# Patient Record
Sex: MALE | Race: WHITE | NOT HISPANIC OR LATINO | Employment: OTHER
[De-identification: names, ages, dates, MRNs, and addresses within clinical notes are randomized per-mention and may not be internally consistent; named-entity substitution may affect disease eponyms.]

---

## 2024-01-24 ENCOUNTER — TRANSCRIBE ORDERS (OUTPATIENT)
Dept: OTHER | Age: 74
End: 2024-01-24

## 2024-01-24 DIAGNOSIS — K60.329 HIGH ANAL FISTULA: Primary | ICD-10-CM

## 2024-02-09 NOTE — TELEPHONE ENCOUNTER
Diagnosis, Referred by & from: High Anal Fistula   Appt date: 3/20/2024   NOTES STATUS DETAILS   OFFICE NOTE from referring provider Care Everywhere [facility]:  1/24/24 - GEN SURG OV with Dr. Roe   OFFICE NOTE from other specialist Care Everywhere [facility]:  12/22/23 - PCC OV with JULIA Miller  2/2/23 - URO OV with Dr. Mcmanus  4/13/17 - GEN SURG OV with Dr. Butcher   DISCHARGE SUMMARY from hospital Care Everywhere [facility]:  2/5/24 - Admission with Jaziel Castillo NP  1/18/24 - Admission with Dr. Mosqueda    [facility]:  1/9/24 - Admission with Dr. Gamble   DISCHARGE REPORT from the ER N/A    OPERATIVE REPORT Care Everywhere [facility]:  1/12/24 - OP Note for Incision and Drainage, Perianal Abscess, Exam Under Anesthesia, Seton Placement, Excision of Perianal Skin Tag with Dr. Roe  8/30/16 - OP Note for Abdominal Wound Debridement with Dr. Duarte  8/15/16 - OP Note for Irrigation, Debridement Abdominal Wound with Dr. Benavides  7/4/16 - OP Note for Open Repair Umbilical Hernia, Mesh with Dr. Butcher   MEDICATION LIST Care Everywhere    LABS     BIOPSIES/PATHOLOGY RELATED TO DIAGNOSIS Care Everywhere [facility]:  1/12/24 - Perianal Skin Tag (Case: [id number])   DIAGNOSTIC PROCEDURES     UPPER ENDOSCOPY (EGD) Care Everywhere [facility]:  4/29/21 - EGD   IMAGING (DISC & REPORT)      CT Received [facility]:  1/11/24 - CT Abd/Pelvis   MRI Received [facility]:  1/27/23 - MRI Prostate   ULTRASOUND  (ENDOANAL/ENDORECTAL) Received [facility]:  10/15/19 - US Abdomen     Records Requested  02/09/24    Facility  [facility]  Fax: [phone]   Outcome * 2/9/24 11:41 AM Faxed urgent request to [facility] for images to be pushed to [facility]. - Shraddha    * 2/21/24 9:53 AM Images received from [facility] and attached to the patient in PACs. - Shraddha

## 2024-03-13 ENCOUNTER — TELEPHONE (OUTPATIENT)
Dept: SURGERY | Facility: CLINIC | Age: 74
End: 2024-03-13
Payer: COMMERCIAL

## 2024-03-13 NOTE — TELEPHONE ENCOUNTER
[facility] Call [facility]    Phone Message    May a detailed message be left on voicemail: yes     Reason for Call: Other: Pt requesting a call back in regards to wanting to know how soon his actual surgery would be scheduled, after his initial consult on 3/20.      Action Taken: Other: clr     Travel Screening: Not Applicable

## 2024-03-20 ENCOUNTER — PRE VISIT (OUTPATIENT)
Dept: SURGERY | Facility: CLINIC | Age: 74
End: 2024-03-20

## 2024-03-20 ENCOUNTER — OFFICE VISIT (OUTPATIENT)
Dept: SURGERY | Facility: CLINIC | Age: 74
End: 2024-03-20
Payer: MEDICARE

## 2024-03-20 VITALS — SYSTOLIC BLOOD PRESSURE: 113 MMHG | HEART RATE: 67 BPM | OXYGEN SATURATION: 97 % | DIASTOLIC BLOOD PRESSURE: 74 MMHG

## 2024-03-20 DIAGNOSIS — K60.30 ANAL FISTULA: Primary | ICD-10-CM

## 2024-03-20 PROCEDURE — 99204 OFFICE O/P NEW MOD 45 MIN: CPT | Mod: 25 | Performed by: NURSE PRACTITIONER

## 2024-03-20 PROCEDURE — 46600 DIAGNOSTIC ANOSCOPY SPX: CPT | Performed by: NURSE PRACTITIONER

## 2024-03-20 RX ORDER — PNV NO.95/FERROUS FUM/FOLIC AC 28MG-0.8MG
500 TABLET ORAL
COMMUNITY
Start: 2024-03-15 | End: 2024-06-13

## 2024-03-20 RX ORDER — TRAZODONE HYDROCHLORIDE 100 MG/1
100 TABLET ORAL
COMMUNITY
Start: 2024-03-08

## 2024-03-20 RX ORDER — FERROUS SULFATE 325(65) MG
325 TABLET ORAL
COMMUNITY
Start: 2023-12-22

## 2024-03-20 RX ORDER — L.RHAMNOSUS/B.ANIMALIS(LACTIS) 3B CELL
1 CAPSULE ORAL EVERY MORNING
COMMUNITY
Start: 2024-03-15 | End: 2024-09-11

## 2024-03-20 RX ORDER — ALLOPURINOL 100 MG/1
100 TABLET ORAL
COMMUNITY
Start: 2023-12-22

## 2024-03-20 RX ORDER — ACETAMINOPHEN 325 MG/1
650 TABLET ORAL EVERY 4 HOURS PRN
COMMUNITY
Start: 2024-02-21

## 2024-03-20 RX ORDER — OMEPRAZOLE 40 MG/1
1 CAPSULE, DELAYED RELEASE ORAL EVERY MORNING
COMMUNITY
Start: 2023-12-22

## 2024-03-20 RX ORDER — ATORVASTATIN CALCIUM 10 MG/1
10 TABLET, FILM COATED ORAL DAILY
COMMUNITY
Start: 2023-12-22

## 2024-03-20 RX ORDER — GABAPENTIN 300 MG/1
CAPSULE ORAL
COMMUNITY
Start: 2023-12-22

## 2024-03-20 RX ORDER — PSYLLIUM HUSK 0.4 G
1000 CAPSULE ORAL
COMMUNITY
Start: 2024-02-20 | End: 2025-02-19

## 2024-03-20 RX ORDER — ACETAMINOPHEN 500 MG
500 TABLET ORAL
COMMUNITY

## 2024-03-20 RX ORDER — BUMETANIDE 0.5 MG/1
0.5 TABLET ORAL
COMMUNITY
Start: 2023-12-22 | End: 2024-12-21

## 2024-03-20 RX ORDER — ASPIRIN 325 MG
325 TABLET ORAL
COMMUNITY

## 2024-03-20 RX ORDER — MELATONIN 10 MG
CAPSULE ORAL
COMMUNITY
Start: 2024-03-05

## 2024-03-20 RX ORDER — HYDRALAZINE HYDROCHLORIDE 50 MG/1
50 TABLET, FILM COATED ORAL
COMMUNITY
Start: 2024-02-19 | End: 2025-02-18

## 2024-03-20 RX ORDER — LISINOPRIL 30 MG/1
30 TABLET ORAL
COMMUNITY
Start: 2023-12-22

## 2024-03-20 RX ORDER — AMOXICILLIN 250 MG
2 CAPSULE ORAL EVERY MORNING
COMMUNITY
Start: 2023-03-15 | End: 2025-02-19

## 2024-03-20 RX ORDER — TAMSULOSIN HYDROCHLORIDE 0.4 MG/1
1 CAPSULE ORAL AT BEDTIME
COMMUNITY
Start: 2024-03-15 | End: 2024-06-13

## 2024-03-20 RX ORDER — VANCOMYCIN HYDROCHLORIDE 125 MG/1
CAPSULE ORAL
COMMUNITY
Start: 2024-02-20

## 2024-03-20 RX ORDER — FOLIC ACID 1 MG/1
1 TABLET ORAL
COMMUNITY
Start: 2023-12-22

## 2024-03-20 RX ORDER — OXYCODONE HYDROCHLORIDE 5 MG/1
TABLET ORAL
Status: ON HOLD | COMMUNITY
Start: 2024-02-21 | End: 2024-05-22

## 2024-03-20 NOTE — PROGRESS NOTES
Colon and Rectal Surgery Consult Clinic Note    Date: 3/20/2024     Referring provider:  Michael Oquendo, DO  [address] [place] [address]     RE: Aniket Ortiz  : 1950  DIONISIO: 3/20/2024    Aniket Ortiz is a very pleasant 74 year old male with past medical conditions including HFpEF, morbid obesity BMi 49.07, CKD stage 3, NERISSA, BMI 46 who presents today for anal fistula.    HPI:  Was admitted with fluid overload after not taking his bumex. He was found to have multiple perirectal wounds requiring I&D in [place] with seton placement for 2 fistula tracts by on 24 by Dr. Castillo.  feculent drainage from an opening near the large verrucal skin tag near the top of the gluteal cleft, there is also feculent drainage from a pinpoint opening midway to the anus, a fistula probe was inserted and these were found to connect the. The skin tag was excised and sent for pathology the opening here was slightly enlarged to allow for further exploration, there was a large amount of feculent/purulent drainage evacuated from this area. We then inserted the bivalve anoscope, there was an obvious fistula opening superior to the sphincters, the external opening for this was just outside the anoderm, this tract was opened using electrocautery given no sphincteric involvement. Careful inspection then revealed a second internal opening, injection of hydrogen peroxide through the other external openings did result in bubbling at this internal opening site. Fistula probe was passed through this area. A vessel loop was then brought to the field and passed through the internal opening to the midpoint external opening, this was secured with silk sutures. An additional seton was placed from the midpoint opening to the furthest opening near the top of the gluteal cleft, this tract is not entirely opened given the complex nation of the fistula disease and the associated abscess at the current time.   Final Diagnosis     A. PERIANAL SKIN TAG, EXCISION  --BENIGN FIBROEPITHELIAL POLYP (SKIN TAG) WITH ASSOCIATED NONSPECIFIC ACUTE AND CHRONIC INFLAMMATION, ACANTHOSIS AND REACTIVE CHANGE  --NO EVIDENCE OF DYSPLASIA OR MALIGNANCY     CT AP 24:  No rim enhancement but air and fluid suggesting abscess.  Axial 7 x 8.3 cm.   Craniocaudal 9 cm.   Moderate rectal and distal sigmoid fecal loading     Physical Examination: Exam was chaperoned by Franky Woodson, EMT-P   /74 (BP Location: Right arm, Patient Position: Sitting, Cuff Size: Adult Large)   Pulse 67   SpO2 97%             Interval History:  Aniket presents today with his wife and son. He has some continued drainage but this is minor. Discomfort with sitting for a long time but otherwise no continuous pain. No difficulty with bowel movements. Stools are formed and soft. History of c.diff. Has never had a colonoscopy. No prior anorectal disease.  Has an indwelling mejias that he wants removed but was reportedly told that it has to stay in until he has surgery on his bottom.    General: alert, oriented, sitting in wheelchair and able to transfer with assist of two  HEENT: mucous membranes moist  : Indwelling mejias catheter present with clear yellow urine    Perianal external examination:  Thickened, firm tissue at the top of the  cleft with seton in place. This extends toward the anal opening where there is a second seton into the anal canal.     Digital rectal examination: Was performed.   Sphincter tone: Good.  Palpable lesions: Yes - Location: seton and ulceration present in the posterior anal canal.  Prostate: Not assessed.  Other: None.    Anoscopy: Was performed.   Hemorrhoids: Yes. 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 Would like to get a 3T pelvic MRI to further define the tract with likely EUA with possible fistulotomy. Has some reactive tissue at the most distal seton site with  biopsies that were normal. Has never had a colonoscopy and is agreeable to getting this closer to home. If not, will need a flexible sigmoidoscopy at the time of EUA. Daniela does not need to stay in place from our standpoint and I advised having him follow up with his PCP to ensure there is not another reason that it is still in place. Patient's questions were answered to his stated satisfaction and he is in agreement with this plan.     Medical history:  No past medical history on file.    Surgical history:  No past surgical history on file.    Problem list:  There are no problems to display for this patient.      Medications:  Current Outpatient Medications   Medication Sig Dispense Refill    acetaminophen (TYLENOL) 325 MG tablet Take 650 mg by mouth every 4 hours as needed      acetaminophen (TYLENOL) 500 MG tablet Take 500 mg by mouth      allopurinol (ZYLOPRIM) 100 MG tablet Take 100 mg by mouth      aspirin (ASA) 325 MG tablet Take 325 mg by mouth      atorvastatin (LIPITOR) 10 MG tablet Take 10 mg by mouth daily      bumetanide (BUMEX) 0.5 MG tablet Take 0.5 mg by mouth      ferrous sulfate (FEROSUL) 325 (65 Fe) MG tablet Take 325 mg by mouth      folic acid (FOLVITE) 1 MG tablet Take 1 mg by mouth      gabapentin (NEURONTIN) 300 MG capsule 2 capsules each morning, 2 capsules at noon, 3 capsules at bedtime.      hydrALAZINE (APRESOLINE) 50 MG tablet Take 50 mg by mouth      lisinopril (ZESTRIL) 30 MG tablet Take 30 mg by mouth      Magnesium Oxide 250 MG tablet Take 500 mg by mouth      Melatonin 10 MG CAPS TAKE 1 CAP BY MOUTH IN THE EVENING      omeprazole (PRILOSEC) 40 MG DR capsule Take 1 capsule by mouth every morning      oxyCODONE (ROXICODONE) 5 MG tablet TAKE 1 TABLET BY MOUTH EVERY 8 HOURS AS NEEDED FOR MODERATE TO SEVERE PAIN. MAX DAILY AMOUTH 15MG      Probiotic Product ([place]) CAPS Take 1 capsule by mouth every morning      senna-docusate (SENOKOT-S/PERICOLACE) 8.6-50 MG tablet Take  2 tablets by mouth every morning      tamsulosin (FLOMAX) 0.4 MG capsule Take 1 capsule by mouth at bedtime      traZODone (DESYREL) 100 MG tablet Take 100 mg by mouth      vancomycin (VANCOCIN) 125 MG capsule EKIT BILLING      VITAMIN D-1000 MAX ST 25 MCG (1000 UT) tablet Take 1,000 Units by mouth         Allergies:  Allergies   Allergen Reactions    Rofecoxib Diarrhea and Rash    Sulfamethoxazole-Trimethoprim Nausea and Vomiting       Family history:  No family history on file.    Social history:  Social History     Tobacco Use    Smoking status: Never    Smokeless tobacco: Never   Substance Use Topics    Alcohol use: Not on file    Marital status: .    Nursing Notes:   Soheila Mercado  3/20/2024 12:17 PM  Signed  Chief Complaint   Patient presents with    Consult     Anal Fistula with seton placement       Vitals:    03/20/24 1212   BP: 113/74   BP Location: Right arm   Patient Position: Sitting   Cuff Size: Adult Large   Pulse: 67   SpO2: 97%       There is no height or weight on file to calculate BMI.    Soheila Mercado CMA       45 minutes spent on the date of encounter performing chart review, history and exam, documentation and further activities as noted above with an additional 2 minutes for anoscopy.     SLICK Choi, NP-C  Colon and Rectal Surgery   [facility]    This note was created using speech recognition software and may contain unintended word substitutions.

## 2024-03-20 NOTE — LETTER
3/20/2024       RE: Aniket Ortiz  [address]  [facility] [address]       Dear Colleague,    Thank you for referring your patient, Aniket Ortiz, to the [facility] [place] [facility]. Please see a copy of my visit note below.    Colon and Rectal Surgery Consult Clinic Note    Date: 3/20/2024     Referring provider:  Michael Oquendo, DO  [address] [place] [place] [address]     RE: Aniket Ortiz  : 1950  DIONISIO: 3/20/2024    Aniket Ortiz is a very pleasant 74 year old male with past medical conditions including HFpEF, morbid obesity BMi 49.07, CKD stage 3, NERISSA, BMI 46 who presents today for anal fistula.    HPI:  Was admitted with fluid overload after not taking his bumex. He was found to have multiple perirectal wounds requiring I&D in [place] with seton placement for 2 fistula tracts by on 24 by Dr. Castillo.  feculent drainage from an opening near the large verrucal skin tag near the top of the gluteal cleft, there is also feculent drainage from a pinpoint opening midway to the anus, a fistula probe was inserted and these were found to connect the. The skin tag was excised and sent for pathology the opening here was slightly enlarged to allow for further exploration, there was a large amount of feculent/purulent drainage evacuated from this area. We then inserted the bivalve anoscope, there was an obvious fistula opening superior to the sphincters, the external opening for this was just outside the anoderm, this tract was opened using electrocautery given no sphincteric involvement. Careful inspection then revealed a second internal opening, injection of hydrogen peroxide through the other external openings did result in bubbling at this internal opening site. Fistula probe was passed through this area. A vessel loop was then brought to the field and passed through the internal opening to  the midpoint external opening, this was secured with silk sutures. An additional seton was placed from the midpoint opening to the furthest opening near the top of the gluteal cleft, this tract is not entirely opened given the complex nation of the fistula disease and the associated abscess at the current time.   Final Diagnosis    A. PERIANAL SKIN TAG, EXCISION  --BENIGN FIBROEPITHELIAL POLYP (SKIN TAG) WITH ASSOCIATED NONSPECIFIC ACUTE AND CHRONIC INFLAMMATION, ACANTHOSIS AND REACTIVE CHANGE  --NO EVIDENCE OF DYSPLASIA OR MALIGNANCY     CT AP 24:  No rim enhancement but air and fluid suggesting abscess.  Axial 7 x 8.3 cm.   Craniocaudal 9 cm.   Moderate rectal and distal sigmoid fecal loading     Physical Examination: Exam was chaperoned by Franky Woodson, EMT-P   /74 (BP Location: Right arm, Patient Position: Sitting, Cuff Size: Adult Large)   Pulse 67   SpO2 97%             Interval History:  Aniket presents today with his wife and son. He has some continued drainage but this is minor. Discomfort with sitting for a long time but otherwise no continuous pain. No difficulty with bowel movements. Stools are formed and soft. History of c.diff. Has never had a colonoscopy. No prior anorectal disease.  Has an indwelling mejias that he wants removed but was reportedly told that it has to stay in until he has surgery on his bottom.    General: alert, oriented, sitting in wheelchair and able to transfer with assist of two  HEENT: mucous membranes moist  : Indwelling mejias catheter present with clear yellow urine    Perianal external examination:  Thickened, firm tissue at the top of the herbert cleft with seton in place. This extends toward the anal opening where there is a second seton into the anal canal.     Digital rectal examination: Was performed.   Sphincter tone: Good.  Palpable lesions: Yes - Location: seton and ulceration present in the posterior anal canal.  Prostate: Not assessed.  Other:  None.    Anoscopy: Was performed.   Hemorrhoids: Yes. 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 Would like to get a 3T pelvic MRI to further define the tract with likely EUA with possible fistulotomy. Has some reactive tissue at the most distal seton site with biopsies that were normal. Has never had a colonoscopy and is agreeable to getting this closer to home. If not, will need a flexible sigmoidoscopy at the time of EUA. Suarez does not need to stay in place from our standpoint and I advised having him follow up with his PCP to ensure there is not another reason that it is still in place. Patient's questions were answered to his stated satisfaction and he is in agreement with this plan.     Medical history:  No past medical history on file.    Surgical history:  No past surgical history on file.    Problem list:  There are no problems to display for this patient.      Medications:  Current Outpatient Medications   Medication Sig Dispense Refill    acetaminophen (TYLENOL) 325 MG tablet Take 650 mg by mouth every 4 hours as needed      acetaminophen (TYLENOL) 500 MG tablet Take 500 mg by mouth      allopurinol (ZYLOPRIM) 100 MG tablet Take 100 mg by mouth      aspirin (ASA) 325 MG tablet Take 325 mg by mouth      atorvastatin (LIPITOR) 10 MG tablet Take 10 mg by mouth daily      bumetanide (BUMEX) 0.5 MG tablet Take 0.5 mg by mouth      ferrous sulfate (FEROSUL) 325 (65 Fe) MG tablet Take 325 mg by mouth      folic acid (FOLVITE) 1 MG tablet Take 1 mg by mouth      gabapentin (NEURONTIN) 300 MG capsule 2 capsules each morning, 2 capsules at noon, 3 capsules at bedtime.      hydrALAZINE (APRESOLINE) 50 MG tablet Take 50 mg by mouth      lisinopril (ZESTRIL) 30 MG tablet Take 30 mg by mouth      Magnesium Oxide 250 MG tablet Take 500 mg by mouth      Melatonin 10 MG CAPS TAKE 1 CAP BY MOUTH IN THE EVENING      omeprazole (PRILOSEC) 40 MG  DR capsule Take 1 capsule by mouth every morning      oxyCODONE (ROXICODONE) 5 MG tablet TAKE 1 TABLET BY MOUTH EVERY 8 HOURS AS NEEDED FOR MODERATE TO SEVERE PAIN. MAX DAILY AMOUTH 15MG      Probiotic Product ([place]eg[place]) CAPS Take 1 capsule by mouth every morning      senna-docusate (SENOKOT-S/PERICOLACE) 8.6-50 MG tablet Take 2 tablets by mouth every morning      tamsulosin (FLOMAX) 0.4 MG capsule Take 1 capsule by mouth at bedtime      traZODone (DESYREL) 100 MG tablet Take 100 mg by mouth      vancomycin (VANCOCIN) 125 MG capsule EKIT BILLING      VITAMIN D-1000 MAX ST 25 MCG (1000 UT) tablet Take 1,000 Units by mouth         Allergies:  Allergies   Allergen Reactions    Rofecoxib Diarrhea and Rash    Sulfamethoxazole-Trimethoprim Nausea and Vomiting       Family history:  No family history on file.    Social history:  Social History     Tobacco Use    Smoking status: Never    Smokeless tobacco: Never   Substance Use Topics    Alcohol use: Not on file    Marital status: .    Nursing Notes:   Soheila Mercado  3/20/2024 12:17 PM  Signed  Chief Complaint   Patient presents with    Consult     Anal Fistula with seton placement       Vitals:    03/20/24 1212   BP: 113/74   BP Location: Right arm   Patient Position: Sitting   Cuff Size: Adult Large   Pulse: 67   SpO2: 97%       There is no height or weight on file to calculate BMI.    Soheila Mercado CMA       45 minutes spent on the date of encounter performing chart review, history and exam, documentation and further activities as noted above with an additional 2 minutes for anoscopy.       This note was created using speech recognition software and may contain unintended word substitutions.          Again, thank you for allowing me to participate in the care of your patient.      Sincerely,    SLICK Lake CNP

## 2024-03-20 NOTE — NURSING NOTE
Chief Complaint   Patient presents with    Consult     Anal Fistula with seton placement       Vitals:    03/20/24 1212   BP: 113/74   BP Location: Right arm   Patient Position: Sitting   Cuff Size: Adult Large   Pulse: 67   SpO2: 97%       There is no height or weight on file to calculate BMI.    Soheila Mercado, CMA

## 2024-03-27 ENCOUNTER — ANCILLARY PROCEDURE (OUTPATIENT)
Dept: MRI IMAGING | Facility: CLINIC | Age: 74
End: 2024-03-27
Attending: NURSE PRACTITIONER
Payer: MEDICARE

## 2024-03-27 PROCEDURE — 72197 MRI PELVIS W/O & W/DYE: CPT | Mod: MG | Performed by: RADIOLOGY

## 2024-03-27 PROCEDURE — G1010 CDSM STANSON: HCPCS | Performed by: RADIOLOGY

## 2024-03-27 PROCEDURE — A9585 GADOBUTROL INJECTION: HCPCS | Performed by: RADIOLOGY

## 2024-03-27 RX ORDER — GADOBUTROL 604.72 MG/ML
15 INJECTION INTRAVENOUS ONCE
Status: COMPLETED | OUTPATIENT
Start: 2024-03-27 | End: 2024-03-27

## 2024-03-27 RX ADMIN — GADOBUTROL 15 ML: 604.72 INJECTION INTRAVENOUS at 09:55

## 2024-03-27 NOTE — DISCHARGE INSTRUCTIONS
MRI Contrast Discharge Instructions    The IV contrast you received today will pass out of your body in your  urine. This will happen in the next 24 hours. You will not feel this process.  Your urine will not change color.    Drink at least 4 extra glasses of water or juice today (unless your doctor  has restricted your fluids). This reduces the stress on your kidneys.  You may take your regular medicines.    If you are on dialysis: It is best to have dialysis today.    If you have a reaction: Most reactions happen right away. If you have  any new symptoms after leaving the hospital (such as hives or swelling),  call your hospital at the correct number below. Or call your family doctor.  If you have breathing distress or wheezing, call 911.    Special instructions: ***    I have read and understand the above information.    Signature:______________________________________ Date:___________    Staff:__________________________________________ Date:___________     Time:__________    [facility] Departments:    ___[place]: [phone]  ___[facility]: [phone]  ___[place]: [phone] ___[facility]: [phone]  ___[facility]: [phone]  ___[facility]: [phone]  ___[phone]  ___[facility]: [phone]  ___[phone]

## 2024-04-10 DIAGNOSIS — K60.30 ANAL FISTULA: Primary | ICD-10-CM

## 2024-04-24 ENCOUNTER — HOSPITAL ENCOUNTER (OUTPATIENT)
Facility: CLINIC | Age: 74
End: 2024-04-24
Attending: SURGERY | Admitting: SURGERY
Payer: MEDICARE

## 2024-04-24 ENCOUNTER — TELEPHONE (OUTPATIENT)
Dept: SURGERY | Facility: CLINIC | Age: 74
End: 2024-04-24
Payer: COMMERCIAL

## 2024-04-24 PROBLEM — K60.30 ANAL FISTULA: Status: ACTIVE | Noted: 2024-04-10

## 2024-04-24 NOTE — TELEPHONE ENCOUNTER
Patient is scheduled for outpatient surgery with Dr. Jose Padilla     Spoke with: Aniket    Date of Surgery: Fri 5/31/2024    Location: [facility] OR    Informed patient they will need an adult  YES    Pre op with Provider Dr. Jose Padilla     H&P: to be scheduled by patient with [facility] in [facility]    2-3 wk Post-op appt scheduled with Solange Avilez PA-C on 6/17/2024 at 9:00 AM    Surgery packet: will Mail     Additional comments:   - Spoke with patient and YENY Amador, from [facility] (comes to patient's home twice per week for wound care, drain monitoring); she will be back at patient's home on Friday 4/26/2024    - forwarded medical concern to Dr. Padilla's RNCC YENY Hamilton, to follow up with patient on his mobile phone number    Darcie Pandya  Miracle-op Coordinator  [place]  Direct Phone: [phone]

## 2024-05-06 RX ORDER — ACETAMINOPHEN 325 MG/1
975 TABLET ORAL ONCE
Status: CANCELLED | OUTPATIENT
Start: 2024-05-06 | End: 2024-05-06

## 2024-05-06 RX ORDER — ONDANSETRON 2 MG/ML
4 INJECTION INTRAMUSCULAR; INTRAVENOUS ONCE
Status: CANCELLED | OUTPATIENT
Start: 2024-05-06 | End: 2024-05-06

## 2024-05-06 NOTE — TELEPHONE ENCOUNTER
Patient is rescheduled for outpatient surgery with Dr. Jose Padilla      Spoke with: Aniket     Date of Surgery: Fri 5/23/2024     Location[facility] OR     Informed patient they will need an adult  YES     Pre op with Provider Dr. Jose Padilla      H&P: to be scheduled by patient with [facility] in [facility]  *Patient says that this is scheduled on 5/15/2024     2-3 wk Post-op appt scheduled with Solange Avilez PA-C on 6/17/2024 at 9:00 AM     Surgery packet: will Mail      Additional comments:   - patient's surgery was rescheduled from 5/31/2024 to 5/23/2024  - patient says that he will have a ride on new DOS 5/23/2024    Darcie Pandya  Miracle-op Coordinator  [place]  Direct Phone: [phone]

## 2024-05-10 VITALS — HEIGHT: 71 IN | BODY MASS INDEX: 44.1 KG/M2 | WEIGHT: 315 LBS

## 2024-05-10 NOTE — TELEPHONE ENCOUNTER
Received call from [facility] OR Control, patient's BMI is +49, which is too high for [facility] OR, so surgery must be moved to a Hospital OR.    Patient is rescheduled for surgery with Dr. Jose Padilla     Spoke with: Aniket Bhagat Date of Surgery: Weds 5/22/2024    Location: [facility] OR    Informed patient they will need an adult  YES     Pre op with Provider Dr. Jose Padilla      H&P: to be scheduled by patient with [facility] in [facility]  *Patient says that this is scheduled on 5/15/2024    Colonoscopy scheduled at [facility] on 5/20/2024     2-3 wk Post-op appt scheduled with Solange Avilez PA-C on 6/17/2024 at 9:00 AM     Surgery packet: will Mail      Additional comments:   - patient's surgery was rescheduled from 5/23/2024 at [facility] OR to 5/22/2024 at [place] OR d/t BMI too high for [facility] OR  - [facility] that [facility]  - Per YENY Hamilton, the results for patient's Colonoscopy scheduled at [facility] on 5/20/2024 will be received in time for surgery at [facility] on 5/22/2024    Darcie Pandya  Miracle-op Coordinator  [place]  Direct Phone: [phone]

## 2024-05-20 ENCOUNTER — TELEPHONE (OUTPATIENT)
Dept: SURGERY | Facility: CLINIC | Age: 74
End: 2024-05-20
Payer: COMMERCIAL

## 2024-05-20 NOTE — TELEPHONE ENCOUNTER
Spoke with Mary Jo from [facility]. She states that patient completed his Pre-op H&P and an EKG at [facility]. These are for patient's upcoming surgery with Dr. Padilla on 5/22/2024.    Mary Jo at [facility] to fax patient's Pre-op H&P and EKG to [facility] Pre-[facility]missions Fax # [phone].     This info may also be available in Care Everywhere.    Darcie Pandya  Miracle-op Coordinator  [facility]  Direct Phone: [phone]

## 2024-05-21 RX ORDER — ASPIRIN 81 MG/1
81 TABLET ORAL DAILY
COMMUNITY

## 2024-05-22 ENCOUNTER — HOSPITAL ENCOUNTER (OUTPATIENT)
Facility: CLINIC | Age: 74
Discharge: HOME | End: 2024-05-22
Attending: SURGERY | Admitting: SURGERY
Payer: MEDICARE

## 2024-05-22 ENCOUNTER — ANESTHESIA EVENT (OUTPATIENT)
Dept: SURGERY | Facility: CLINIC | Age: 74
End: 2024-05-22
Payer: MEDICARE

## 2024-05-22 ENCOUNTER — ANESTHESIA (OUTPATIENT)
Dept: SURGERY | Facility: CLINIC | Age: 74
End: 2024-05-22
Payer: MEDICARE

## 2024-05-22 VITALS
DIASTOLIC BLOOD PRESSURE: 86 MMHG | RESPIRATION RATE: 20 BRPM | HEART RATE: 107 BPM | HEIGHT: 71 IN | TEMPERATURE: 97.9 F | BODY MASS INDEX: 44.1 KG/M2 | OXYGEN SATURATION: 97 % | SYSTOLIC BLOOD PRESSURE: 151 MMHG | WEIGHT: 315 LBS

## 2024-05-22 DIAGNOSIS — K60.30 ANAL FISTULA: Primary | ICD-10-CM

## 2024-05-22 PROCEDURE — 46280 REMOVE ANAL FIST COMPLEX: CPT | Mod: GC | Performed by: SURGERY

## 2024-05-22 PROCEDURE — 87176 TISSUE HOMOGENIZATION CULTR: CPT | Performed by: SURGERY

## 2024-05-22 PROCEDURE — 87206 SMEAR FLUORESCENT/ACID STAI: CPT | Performed by: SURGERY

## 2024-05-22 PROCEDURE — 250N000011 HC RX IP 250 OP 636: Performed by: NURSE ANESTHETIST, CERTIFIED REGISTERED

## 2024-05-22 PROCEDURE — 250N000011 HC RX IP 250 OP 636: Performed by: SURGERY

## 2024-05-22 PROCEDURE — 250N000009 HC RX 250: Performed by: SURGERY

## 2024-05-22 PROCEDURE — 46280 REMOVE ANAL FIST COMPLEX: CPT | Performed by: ANESTHESIOLOGY

## 2024-05-22 PROCEDURE — 999N000141 HC STATISTIC PRE-PROCEDURE NURSING ASSESSMENT: Performed by: SURGERY

## 2024-05-22 PROCEDURE — 46280 REMOVE ANAL FIST COMPLEX: CPT | Performed by: NURSE ANESTHETIST, CERTIFIED REGISTERED

## 2024-05-22 PROCEDURE — 710N000012 HC RECOVERY PHASE 2, PER MINUTE: Performed by: SURGERY

## 2024-05-22 PROCEDURE — 370N000017 HC ANESTHESIA TECHNICAL FEE, PER MIN: Performed by: SURGERY

## 2024-05-22 PROCEDURE — 250N000013 HC RX MED GY IP 250 OP 250 PS 637: Performed by: SURGERY

## 2024-05-22 PROCEDURE — 87081 CULTURE SCREEN ONLY: CPT | Performed by: SURGERY

## 2024-05-22 PROCEDURE — 99100 ANES PT EXTEME AGE<1 YR&>70: CPT | Performed by: NURSE ANESTHETIST, CERTIFIED REGISTERED

## 2024-05-22 PROCEDURE — 250N000009 HC RX 250: Performed by: ANESTHESIOLOGY

## 2024-05-22 PROCEDURE — 99100 ANES PT EXTEME AGE<1 YR&>70: CPT | Performed by: ANESTHESIOLOGY

## 2024-05-22 PROCEDURE — 272N000001 HC OR GENERAL SUPPLY STERILE: Performed by: SURGERY

## 2024-05-22 PROCEDURE — 360N000075 HC SURGERY LEVEL 2, PER MIN: Performed by: SURGERY

## 2024-05-22 PROCEDURE — 258N000003 HC RX IP 258 OP 636: Performed by: NURSE ANESTHETIST, CERTIFIED REGISTERED

## 2024-05-22 RX ORDER — OXYCODONE HYDROCHLORIDE 5 MG/1
5 TABLET ORAL
Status: DISCONTINUED | OUTPATIENT
Start: 2024-05-22 | End: 2024-05-22

## 2024-05-22 RX ORDER — ONDANSETRON 2 MG/ML
4 INJECTION INTRAMUSCULAR; INTRAVENOUS ONCE
Status: DISCONTINUED | OUTPATIENT
Start: 2024-05-22 | End: 2024-05-22 | Stop reason: HOSPADM

## 2024-05-22 RX ORDER — ACETAMINOPHEN 325 MG/1
975 TABLET ORAL ONCE
Status: COMPLETED | OUTPATIENT
Start: 2024-05-22 | End: 2024-05-22

## 2024-05-22 RX ORDER — OXYCODONE HYDROCHLORIDE 5 MG/1
5-10 TABLET ORAL EVERY 4 HOURS PRN
Qty: 15 TABLET | Refills: 0 | Status: SHIPPED | OUTPATIENT
Start: 2024-05-22

## 2024-05-22 RX ORDER — GLYCOPYRROLATE 0.2 MG/ML
INJECTION, SOLUTION INTRAMUSCULAR; INTRAVENOUS PRN
Status: DISCONTINUED | OUTPATIENT
Start: 2024-05-22 | End: 2024-05-22

## 2024-05-22 RX ORDER — NALOXONE HYDROCHLORIDE 0.4 MG/ML
0.1 INJECTION, SOLUTION INTRAMUSCULAR; INTRAVENOUS; SUBCUTANEOUS
Status: DISCONTINUED | OUTPATIENT
Start: 2024-05-22 | End: 2024-05-22

## 2024-05-22 RX ORDER — ONDANSETRON 4 MG/1
4 TABLET, ORALLY DISINTEGRATING ORAL EVERY 30 MIN PRN
Status: DISCONTINUED | OUTPATIENT
Start: 2024-05-22 | End: 2024-05-22

## 2024-05-22 RX ORDER — PROPOFOL 10 MG/ML
INJECTION, EMULSION INTRAVENOUS CONTINUOUS PRN
Status: DISCONTINUED | OUTPATIENT
Start: 2024-05-22 | End: 2024-05-22

## 2024-05-22 RX ORDER — LIDOCAINE 40 MG/G
CREAM TOPICAL
Status: DISCONTINUED | OUTPATIENT
Start: 2024-05-22 | End: 2024-05-22 | Stop reason: HOSPADM

## 2024-05-22 RX ORDER — HYDROMORPHONE HYDROCHLORIDE 1 MG/ML
0.2 INJECTION, SOLUTION INTRAMUSCULAR; INTRAVENOUS; SUBCUTANEOUS EVERY 5 MIN PRN
Status: DISCONTINUED | OUTPATIENT
Start: 2024-05-22 | End: 2024-05-22

## 2024-05-22 RX ORDER — FENTANYL CITRATE 50 UG/ML
25 INJECTION, SOLUTION INTRAMUSCULAR; INTRAVENOUS EVERY 5 MIN PRN
Status: DISCONTINUED | OUTPATIENT
Start: 2024-05-22 | End: 2024-05-22

## 2024-05-22 RX ORDER — ONDANSETRON 4 MG/1
4 TABLET, ORALLY DISINTEGRATING ORAL
Status: DISCONTINUED | OUTPATIENT
Start: 2024-05-22 | End: 2024-05-22 | Stop reason: HOSPADM

## 2024-05-22 RX ORDER — OXYCODONE HYDROCHLORIDE 10 MG/1
10 TABLET ORAL
Status: DISCONTINUED | OUTPATIENT
Start: 2024-05-22 | End: 2024-05-22

## 2024-05-22 RX ORDER — DEXAMETHASONE SODIUM PHOSPHATE 4 MG/ML
4 INJECTION, SOLUTION INTRA-ARTICULAR; INTRALESIONAL; INTRAMUSCULAR; INTRAVENOUS; SOFT TISSUE
Status: DISCONTINUED | OUTPATIENT
Start: 2024-05-22 | End: 2024-05-22

## 2024-05-22 RX ORDER — HYDROMORPHONE HYDROCHLORIDE 1 MG/ML
0.4 INJECTION, SOLUTION INTRAMUSCULAR; INTRAVENOUS; SUBCUTANEOUS EVERY 5 MIN PRN
Status: DISCONTINUED | OUTPATIENT
Start: 2024-05-22 | End: 2024-05-22

## 2024-05-22 RX ORDER — FENTANYL CITRATE 50 UG/ML
INJECTION, SOLUTION INTRAMUSCULAR; INTRAVENOUS PRN
Status: DISCONTINUED | OUTPATIENT
Start: 2024-05-22 | End: 2024-05-22

## 2024-05-22 RX ORDER — ONDANSETRON 2 MG/ML
INJECTION INTRAMUSCULAR; INTRAVENOUS PRN
Status: DISCONTINUED | OUTPATIENT
Start: 2024-05-22 | End: 2024-05-22

## 2024-05-22 RX ORDER — ONDANSETRON 2 MG/ML
4 INJECTION INTRAMUSCULAR; INTRAVENOUS EVERY 30 MIN PRN
Status: DISCONTINUED | OUTPATIENT
Start: 2024-05-22 | End: 2024-05-22

## 2024-05-22 RX ORDER — FENTANYL CITRATE 50 UG/ML
50 INJECTION, SOLUTION INTRAMUSCULAR; INTRAVENOUS EVERY 5 MIN PRN
Status: DISCONTINUED | OUTPATIENT
Start: 2024-05-22 | End: 2024-05-22

## 2024-05-22 RX ORDER — SODIUM CHLORIDE, SODIUM LACTATE, POTASSIUM CHLORIDE, CALCIUM CHLORIDE 600; 310; 30; 20 MG/100ML; MG/100ML; MG/100ML; MG/100ML
INJECTION, SOLUTION INTRAVENOUS CONTINUOUS PRN
Status: DISCONTINUED | OUTPATIENT
Start: 2024-05-22 | End: 2024-05-22

## 2024-05-22 RX ORDER — NYSTATIN 100000 [USP'U]/G
POWDER TOPICAL
COMMUNITY
Start: 2024-03-19 | End: 2025-03-19

## 2024-05-22 RX ORDER — SODIUM CHLORIDE, SODIUM LACTATE, POTASSIUM CHLORIDE, CALCIUM CHLORIDE 600; 310; 30; 20 MG/100ML; MG/100ML; MG/100ML; MG/100ML
INJECTION, SOLUTION INTRAVENOUS CONTINUOUS
Status: DISCONTINUED | OUTPATIENT
Start: 2024-05-22 | End: 2024-05-22

## 2024-05-22 RX ADMIN — SODIUM CHLORIDE, POTASSIUM CHLORIDE, SODIUM LACTATE AND CALCIUM CHLORIDE: 600; 310; 30; 20 INJECTION, SOLUTION INTRAVENOUS at 11:15

## 2024-05-22 RX ADMIN — MIDAZOLAM 1 MG: 1 INJECTION INTRAMUSCULAR; INTRAVENOUS at 11:57

## 2024-05-22 RX ADMIN — FENTANYL CITRATE 50 MCG: 50 INJECTION INTRAMUSCULAR; INTRAVENOUS at 11:23

## 2024-05-22 RX ADMIN — GLYCOPYRROLATE 0.2 MG: 0.2 INJECTION, SOLUTION INTRAMUSCULAR; INTRAVENOUS at 11:15

## 2024-05-22 RX ADMIN — ONDANSETRON 4 MG: 2 INJECTION INTRAMUSCULAR; INTRAVENOUS at 12:20

## 2024-05-22 RX ADMIN — LIDOCAINE HYDROCHLORIDE 0.2 ML: 10 INJECTION, SOLUTION EPIDURAL; INFILTRATION; INTRACAUDAL; PERINEURAL at 10:11

## 2024-05-22 RX ADMIN — PROPOFOL 50 MCG/KG/MIN: 10 INJECTION, EMULSION INTRAVENOUS at 11:20

## 2024-05-22 RX ADMIN — FENTANYL CITRATE 50 MCG: 50 INJECTION INTRAMUSCULAR; INTRAVENOUS at 11:45

## 2024-05-22 RX ADMIN — MIDAZOLAM 1 MG: 1 INJECTION INTRAMUSCULAR; INTRAVENOUS at 11:23

## 2024-05-22 RX ADMIN — ACETAMINOPHEN 975 MG: 325 TABLET, FILM COATED ORAL at 10:24

## 2024-05-22 ASSESSMENT — ACTIVITIES OF DAILY LIVING (ADL)
ADLS_ACUITY_SCORE: 33
ADLS_ACUITY_SCORE: 35

## 2024-05-22 ASSESSMENT — LIFESTYLE VARIABLES: TOBACCO_USE: 1

## 2024-05-22 NOTE — DISCHARGE INSTRUCTIONS
Contacting your Doctor -   To contact a doctor, call Dr. Padilla [phone] or:  [phone] and ask for the resident on call for [place] (answered 24 hours a day)   Emergency Department:  [facility]: [phone]  [facility]: [phone] 911 if you are in need of immediate or emergent help

## 2024-05-22 NOTE — OP NOTE
"Colorectal surgery operative note    PREOPERATIVE DIAGNOSIS:  1. History of anorectal infection.  2. Suspicion for anorectal fistula.  3. Two setons in place    POSTOPERATIVE DIAGNOSIS:   Same    PROCEDURE:  1. Evaluation under anesthesia.  2. Lay-open fistulotomy x 1  3. Rectal biopsies for actinomyces and TB    ANESTHESIA: MAC plus local anesthesia.    SURGEON: Jose Padilla M.D.    ASSISTANT(S): Corine Pastor MD, CRS Fellow.    INDICATIONS FOR PROCEDURE  Aniket Ortiz is a 74 year old male who presented with two setons in place and a transsphincteric anorectal fistula. I thoroughly discussed the risks, benefits, and alternatives of operative treatment with the patient and he/she agreed to proceed.    General risks related to anorectal surgery were reviewed with the patient. These include, but are not limited to urinary retention, abscess, infection, bleeding, chronic pain, anal stenosis, fistula, fissure, and fecal incontinence.     OPERATIVE PROCEDURE: After obtaining informed consent, the patient was brought to the operating room and placed in the prone jackknife position. MAC anesthesia was gently induced. Bilateral lower extremity pneumatic compression devices were applied and all pressure points were cushioned. The perianal area was then preped and draped in the standard sterile fashion. After a \"time-out\" was performed, a total of 80 mL of Bupivacaine 0.25% and Lidocaine 1% with epinephrine was injected as a pudendal block and local anesthesia in order to achieve pain control. Digital rectal exam and anoscopy were performed. Findings: There were 2 setons in place, one through a transsphincteric fistula, the other was above it and in the lower back subcutaneous tissue. While the transsphincteric fistula encompassed the entirety of the sphincter complex, the lower back seton did not involve any muscle fibers The fistula was layed open with monopolar electrocautery. The granulation tissue was curetted " and sent off for pathologic examination. There was more than expected oozing from the bed and hemostasis was achieved with surgicel, compression, and electrocautery. Rectal biopsies were taken sharply and sent for microbiological examination. The distal rectum and anal canal were irrigated. Hemostasis was achieved. Instrument, sponge, and needle counts were all correct as reported to me. Bacitracin was applied, as well as a sterile dressing. The patient tolerated the procedure well.    COMPLICATIONS: none, immediately.    ESTIMATED BLOOD LOSS: 15 mL.    SPECIMEN(S): Rectal biopsies and fistula tract biopsy.    DRAINS/TUBES: None    OPERATIVE FINDINGS: Transsphincteric fistula and lower back subcutaneous fistula tract.    DISPOSITION: PACU.    Jose Padilla MD    Division of Colon & Rectal Surgery  Department of Surgery  [facility]  [phone]

## 2024-05-22 NOTE — OR NURSING
Upon entry to OR, patient had 2 yellow vessel loops in as a seton in buttocks.  One yellow vessel loop was removed, one yellow vessel loop remained placed and we added one 4x4  dry packed in buttocks wound

## 2024-05-22 NOTE — ANESTHESIA CARE TRANSFER NOTE
Patient: Aniket Ortiz    Procedure: Procedure(s):  exam under anesthesia, anal biopsy for actinomyces and tuberculosis       Diagnosis: Anal fistula [K60.3]  Diagnosis Additional Information: No value filed.    Anesthesia Type:   MAC     Note:      Level of Consciousness: awake  Oxygen Supplementation: room air    Independent Airway: airway patency satisfactory and stable  Dentition: dentition unchanged  Vital Signs Stable: post-procedure vital signs reviewed and stable  Report to RN Given: handoff report given  Patient transferred to: Phase II    Handoff Report: Identifed the Patient, Identified the Reponsible Provider, Reviewed the pertinent medical history, Discussed the surgical course, Reviewed Intra-OP anesthesia mangement and issues during anesthesia, Set expectations for post-procedure period and Allowed opportunity for questions and acknowledgement of understanding      Vitals:  Vitals Value Taken Time   /58 05/22/24 1222   Temp     Pulse     Resp     SpO2 97 % 05/22/24 1223   Vitals shown include unfiled device data.    Electronically Signed By: SLICK Quintanilla CRNA  May 22, 2024  12:24 PM

## 2024-05-22 NOTE — OR NURSING
Discharge instructions printed and reviewed with patient and family*.  All questions answered at this time.  Discharge prescriptions for Oxy is being picked up at the pharmacy. All belongings returned to patient at this time.

## 2024-05-22 NOTE — ANESTHESIA PREPROCEDURE EVALUATION
"Anesthesia Pre-Procedure Evaluation    Patient: Aniket Ortiz   MRN: [id number] : 1950        Procedure : Procedure(s):  exam under anesthesia, anal biopsy for actinomyces and tuberculosis          History reviewed. No pertinent past medical history.   History reviewed. No pertinent surgical history.   Allergies   Allergen Reactions     Rofecoxib Diarrhea and Rash     Sulfamethoxazole-Trimethoprim Nausea and Vomiting      Social History     Tobacco Use     Smoking status: Never     Smokeless tobacco: Never   Substance Use Topics     Alcohol use: Yes     Comment: occasional      Wt Readings from Last 1 Encounters:   24 (!) 150.6 kg (332 lb)        Anesthesia Evaluation   Pt has had prior anesthetic.         ROS/MED HX  ENT/Pulmonary:     (+) sleep apnea, uses CPAP,              tobacco use, Past use,                       Neurologic:       Cardiovascular:     (+) Dyslipidemia hypertension- -   -  - -                                      METS/Exercise Tolerance:     Hematologic:     (+)      anemia,          Musculoskeletal:       GI/Hepatic:     (+) GERD,                   Renal/Genitourinary:     (+) renal disease, type: CRI,            Endo:     (+)               Obesity,       Psychiatric/Substance Use:       Infectious Disease:       Malignancy:       Other:          Physical Exam    Airway        Mallampati: I   TM distance: > 3 FB   Neck ROM: full   Mouth opening: > 3 cm    Respiratory Devices and Support         Dental       (+) Minor Abnormalities - some fillings, tiny chips      Cardiovascular   cardiovascular exam normal       Rhythm and rate: regular and normal     Pulmonary   pulmonary exam normal        breath sounds clear to auscultation       OUTSIDE LABS:  CBC: No results found for: \"WBC\", \"HGB\", \"HCT\", \"PLT\"  BMP: No results found for: \"NA\", \"POTASSIUM\", \"CHLORIDE\", \"CO2\", \"BUN\", \"CR\", \"GLC\"  COAGS: No results found for: \"PTT\", \"INR\", \"FIBR\"  POC: No results found for: \"BGM\", \"HCG\", " "\"HCGS\"  HEPATIC: No results found for: \"ALBUMIN\", \"PROTTOTAL\", \"ALT\", \"AST\", \"GGT\", \"ALKPHOS\", \"BILITOTAL\", \"BILIDIRECT\", \"SUZETTE\"  OTHER: No results found for: \"PH\", \"LACT\", \"A1C\", \"SHRUTHI\", \"PHOS\", \"MAG\", \"LIPASE\", \"AMYLASE\", \"TSH\", \"T4\", \"T3\", \"CRP\", \"SED\"    Anesthesia Plan    ASA Status:  3    NPO Status:  NPO Appropriate    Anesthesia Type: MAC.     - Reason for MAC: immobility needed, straight local not clinically adequate   Induction: Intravenous, Propofol.   Maintenance: TIVA.        Consents    Anesthesia Plan(s) and associated risks, benefits, and realistic alternatives discussed. Questions answered and patient/representative(s) expressed understanding.     - Discussed: Risks, Benefits and Alternatives for BOTH SEDATION and the PROCEDURE were discussed     - Discussed with:  Patient      - Extended Intubation/Ventilatory Support Discussed: No.      - Patient is DNR/DNI Status: No     Use of blood products discussed: No .     Postoperative Care    Pain management: IV analgesics, Oral pain medications, Multi-modal analgesia.   PONV prophylaxis: Ondansetron (or other 5HT-3)     Comments:             Sharad Box MD    I have reviewed the pertinent notes and labs in the chart from the past 30 days and (re)examined the patient.  Any updates or changes from those notes are reflected in this note.             # Drug Induced Platelet Defect: home medication list includes an antiplatelet medication  # Severe Obesity: Estimated body mass index is 46.95 kg/m  as calculated from the following:    Height as of this encounter: 1.791 m (5' 10.51\").    Weight as of this encounter: 150.6 kg (332 lb).      "

## 2024-05-23 NOTE — ANESTHESIA POSTPROCEDURE EVALUATION
Patient: Aniket Ortiz    Procedure: Procedure(s):  exam under anesthesia, anal biopsy for actinomyces and tuberculosis       Anesthesia Type:  MAC    Note:  Disposition: Outpatient   Postop Pain Control: Uneventful            Sign Out: Well controlled pain   PONV: No   Neuro/Psych: Uneventful            Sign Out: Acceptable/Baseline neuro status   Airway/Respiratory: Uneventful            Sign Out: Acceptable/Baseline resp. status   CV/Hemodynamics: Uneventful            Sign Out: Acceptable CV status; No obvious hypovolemia; No obvious fluid overload   Other NRE: NONE   DID A NON-ROUTINE EVENT OCCUR? No       Last vitals:  Vitals Value Taken Time   /86 05/22/24 1310   Temp 36.6  C (97.9  F) 05/22/24 1309   Pulse 107 05/22/24 1309   Resp     SpO2 96 % 05/22/24 1310   Vitals shown include unfiled device data.    Electronically Signed By: Sharad Box MD  May 23, 2024  1:50 PM

## 2024-05-24 ENCOUNTER — TELEPHONE (OUTPATIENT)
Dept: SURGERY | Facility: CLINIC | Age: 74
End: 2024-05-24
Payer: COMMERCIAL

## 2024-05-24 NOTE — TELEPHONE ENCOUNTER
[facility] Call Center    Phone Message    May a detailed message be left on voicemail: yes     Reason for Call: Other: Needing to reschedule post op on 5/30 they can't make this appt pt's wife says. Looking to re schedule it on Monday or Tuesday after, 3rd or 4th of June as early in the morning as possible.     Action Taken: Other: clinic coords    Travel Screening: Not Applicable                                                                   
Received message to call patient/spouse Kimberly to reschedule patient's Post-op appt with Edyta Sumner NP on 5/30/2024.    Called and spoke with patient's spouse Kimberly, they cannot get a ride to the post-op appt currently scheduled on 5/30/2024 because their  cannot get off work that date.    They requested to reschedule the Post-op appt to June 3rd or 4th at approximately 8:00 AM so that their  would be available to drive them to the appt.    Rescheduled post-op appt with Edyta Sumner NP to Tues 6/40/2024 at 8:30 AM (8:15 AM arrival time).    Darcie Pandya  [facility]-op Coordinator  [facility]  Direct Phone: [phone]   
no

## 2024-06-04 ENCOUNTER — OFFICE VISIT (OUTPATIENT)
Dept: SURGERY | Facility: CLINIC | Age: 74
End: 2024-06-04
Payer: COMMERCIAL

## 2024-06-04 VITALS
OXYGEN SATURATION: 97 % | HEART RATE: 60 BPM | SYSTOLIC BLOOD PRESSURE: 120 MMHG | BODY MASS INDEX: 44.1 KG/M2 | DIASTOLIC BLOOD PRESSURE: 71 MMHG | WEIGHT: 315 LBS | HEIGHT: 71 IN

## 2024-06-04 DIAGNOSIS — Z09 FOLLOW-UP EXAMINATION AFTER COLORECTAL SURGERY: Primary | ICD-10-CM

## 2024-06-04 DIAGNOSIS — K60.30 ANAL FISTULA: ICD-10-CM

## 2024-06-04 PROCEDURE — 99024 POSTOP FOLLOW-UP VISIT: CPT | Performed by: NURSE PRACTITIONER

## 2024-06-04 ASSESSMENT — PAIN SCALES - GENERAL: PAINLEVEL: NO PAIN (0)

## 2024-06-04 NOTE — NURSING NOTE
"Chief Complaint   Patient presents with    Post-op Visit       Vitals:    06/04/24 0823   BP: 120/71   BP Location: Right arm   Patient Position: Sitting   Cuff Size: Adult Large   Pulse: 60   SpO2: 97%   Weight: (!) 332 lb   Height: 5' 10.5\"       Body mass index is 46.96 kg/m .    Soheila Mercado CMA    "

## 2024-06-04 NOTE — LETTER
2024       RE: Aniket Ortiz  [address]  [facility] [address]       Dear Colleague,    Thank you for referring your patient, Aniket Ortiz, to the [facility] [place] at [facility]. Please see a copy of my visit note below.    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    MR Pelvis 3/27/24:  Impression:   1. Complex transsphincteric fistula with internal opening located to  the left of midline posteriorly at 5 o' clock position approximately  1.2 cm from the anal verge, with multiple branching tracts opening  externally in the right and left gluteal folds.   2. No abscess.    Colonoscopy 24:  Impression:       - Preparation of the colon was inadequate.        - Diverticulosis from sigmoid to transverse colon.        - A single non-bleeding colonic angiodysplastic lesion.        - The entire examined colon is normal. Biopsied.        - The examined portion of the ileum was normal. Biopsied.   Recommendation:       - pt should have f/u at [place] for the perianal diseease        - f/u path        - Pt will need CT colonography or barium to check the colon for defects.        If repeated colonoscopy needed will 5d off fiber, 2d of prep.   Final Diagnosis    A. TERMINAL ILEUM, BIOPSY  --NO DIAGNOSTIC ABNORMALITY  B. COLON, ASCENDING/RIGHT, BIOPSY  --NO DIAGNOSTIC ABNORMALITY  C. COLON, DESCENDING/LEFT, BIOPSY  --NO DIAGNOSTIC ABNORMALITY  D. COLON, RECTUM, BIOPSY  --NO DIAGNOSTIC ABNORMALITY     Interval history: Aniket has been doing well. He denies any significant pain. Wife says drainage is foul  "smelling. No fevers or chills. He only has bowel movements every few days and these are always soft/formed. No diarrhea.     Physical Examination: Exam was chaperoned by Franky Woodson, EMT-P   /71 (BP Location: Right arm, Patient Position: Sitting, Cuff Size: Adult Large)   Pulse 60   Ht 5' 10.5\"   Wt (!) 332 lb   SpO2 97%   BMI 46.96 kg/m    General: alert, oriented, in no acute distress, sitting comfortably  HEENT: mucous membranes moist    Perianal external examination:  Surgical site in the posterior position with good granulation tissue present. No erythema or purulent drainage. Wet to dry dressing changed. Yellow vessel loop seton in place anterior to this with ties to the external tract.    Digital rectal examination: Was deferred.    Anoscopy: Was deferred.    Assessment/Plan:  74 year old male status post EUA with lay-open fistulotomy X1 and rectal biopsies for actinomyces and TB, which were both negative. Follow up with a urologist for indwelling mejias. Should follow up per colonoscopy recommendations. Wound is healing well. Continue with daily dressing changes. Follow up in four weeks for wound check. Once wound is healed, will have him meet with Dr. Padilla to discuss possible LIFT. Patient's questions were answered to his stated satisfaction and he is in agreement with this plan.     Medical history:  No past medical history on file.    Surgical history:  Past Surgical History:   Procedure Laterality Date    BIOPSY RECTUM N/A 5/22/2024    Procedure: exam under anesthesia, anal biopsy for actinomyces and tuberculosis;  Surgeon: Jose Padilla MD;  Location: [facility] OR       Problem list:    Patient Active Problem List    Diagnosis Date Noted    Anal fistula 04/10/2024     Priority: Medium       Medications:  Current Outpatient Medications   Medication Sig Dispense Refill    acetaminophen (TYLENOL) 500 MG tablet Take 500 mg by mouth      allopurinol (ZYLOPRIM) 100 MG tablet Take 100 mg by mouth   "    aspirin 81 MG EC tablet Take 81 mg by mouth daily      atorvastatin (LIPITOR) 10 MG tablet Take 10 mg by mouth daily      bumetanide (BUMEX) 0.5 MG tablet Take 0.5 mg by mouth      ferrous sulfate (FEROSUL) 325 (65 Fe) MG tablet Take 325 mg by mouth      folic acid (FOLVITE) 1 MG tablet Take 1 mg by mouth      gabapentin (NEURONTIN) 300 MG capsule 2 capsules each morning, 2 capsules at noon, 3 capsules at bedtime.      hydrALAZINE (APRESOLINE) 50 MG tablet Take 50 mg by mouth      lisinopril (ZESTRIL) 30 MG tablet Take 30 mg by mouth      Magnesium Oxide 250 MG tablet Take 500 mg by mouth      Melatonin 10 MG CAPS TAKE 1 CAP BY MOUTH IN THE EVENING      nystatin (MYCOSTATIN) 620561 UNIT/GM external powder by topical route every 8 hours if needed (rash).      omeprazole (PRILOSEC) 40 MG DR capsule Take 1 capsule by mouth every morning      oxyCODONE (ROXICODONE) 5 MG tablet Take 1-2 tablets (5-10 mg) by mouth every 4 hours as needed for severe pain 15 tablet 0    Probiotic Product ([employer]) CAPS Take 1 capsule by mouth every morning      senna-docusate (SENOKOT-S/PERICOLACE) 8.6-50 MG tablet Take 2 tablets by mouth every morning      VITAMIN D-1000 MAX ST 25 MCG (1000 UT) tablet Take 1,000 Units by mouth      acetaminophen (TYLENOL) 325 MG tablet Take 650 mg by mouth every 4 hours as needed (Patient not taking: Reported on 6/4/2024)      aspirin (ASA) 325 MG tablet Take 325 mg by mouth (Patient not taking: Reported on 6/4/2024)      tamsulosin (FLOMAX) 0.4 MG capsule Take 1 capsule by mouth at bedtime (Patient not taking: Reported on 6/4/2024)      traZODone (DESYREL) 100 MG tablet Take 100 mg by mouth (Patient not taking: Reported on 6/4/2024)      vancomycin (VANCOCIN) 125 MG capsule EKIT BILLING (Patient not taking: Reported on 6/4/2024)         Allergies:  Allergies   Allergen Reactions    Rofecoxib Diarrhea and Rash    Sulfamethoxazole-Trimethoprim Nausea and Vomiting       Family history:  No  "family history on file.    Social history:  Social History     Tobacco Use    Smoking status: Never    Smokeless tobacco: Never   Substance Use Topics    Alcohol use: Yes     Comment: occasional     Marital status: .    Nursing Notes:   Soheila Mercado  6/4/2024  8:30 AM  Signed  Chief Complaint   Patient presents with    Post-op Visit       Vitals:    06/04/24 0823   BP: 120/71   BP Location: Right arm   Patient Position: Sitting   Cuff Size: Adult Large   Pulse: 60   SpO2: 97%   Weight: (!) 332 lb   Height: 5' 10.5\"       Body mass index is 46.96 kg/m .    Soheila Mercado CMA       15 minutes spent on the date of the encounter doing chart review, history and exam, documentation and further activities as noted above.   This is a postop visit.      This note was created using speech recognition software and may contain unintended word substitutions.        Again, thank you for allowing me to participate in the care of your patient.      Sincerely,    SLICK Lake CNP    "

## 2024-06-04 NOTE — PROGRESS NOTES
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    MR Pelvis 3/27/24:  Impression:   1. Complex transsphincteric fistula with internal opening located to  the left of midline posteriorly at 5 o' clock position approximately  1.2 cm from the anal verge, with multiple branching tracts opening  externally in the right and left gluteal folds.   2. No abscess.    Colonoscopy 24:  Impression:       - Preparation of the colon was inadequate.        - Diverticulosis from sigmoid to transverse colon.        - A single non-bleeding colonic angiodysplastic lesion.        - The entire examined colon is normal. Biopsied.        - The examined portion of the ileum was normal. Biopsied.   Recommendation:       - pt should have f/u at [place] for the perianal diseease        - f/u path        - Pt will need CT colonography or barium to check the colon for defects.        If repeated colonoscopy needed will 5d off fiber, 2d of prep.   Final Diagnosis    A. TERMINAL ILEUM, BIOPSY  --NO DIAGNOSTIC ABNORMALITY  B. COLON, ASCENDING/RIGHT, BIOPSY  --NO DIAGNOSTIC ABNORMALITY  C. COLON, DESCENDING/LEFT, BIOPSY  --NO DIAGNOSTIC ABNORMALITY  D. COLON, RECTUM, BIOPSY  --NO DIAGNOSTIC ABNORMALITY     Interval history: Aniket has been doing well. He denies any significant pain. Wife says drainage is foul smelling. No fevers or chills. He only has bowel movements every few days and these are always soft/formed. No diarrhea.     Physical Examination: Exam was chaperoned by Franky Woodson, EMT-P   /71 (BP Location: Right arm, Patient Position: Sitting, Cuff Size: Adult Large)   Pulse 60   Ht 5' 10.5\"   Wt (!) 332 lb   SpO2 97%   " BMI 46.96 kg/m    General: alert, oriented, in no acute distress, sitting comfortably  HEENT: mucous membranes moist    Perianal external examination:  Surgical site in the posterior position with good granulation tissue present. No erythema or purulent drainage. Wet to dry dressing changed. Yellow vessel loop seton in place anterior to this with ties to the external tract.    Digital rectal examination: Was deferred.    Anoscopy: Was deferred.    Assessment/Plan:  74 year old male status post EUA with lay-open fistulotomy X1 and rectal biopsies for actinomyces and TB, which were both negative. Follow up with a urologist for indwelling mejias. Should follow up per colonoscopy recommendations. Wound is healing well. Continue with daily dressing changes. Follow up in four weeks for wound check. Once wound is healed, will have him meet with Dr. Padilla to discuss possible LIFT. Patient's questions were answered to his stated satisfaction and he is in agreement with this plan.     Medical history:  No past medical history on file.    Surgical history:  Past Surgical History:   Procedure Laterality Date    BIOPSY RECTUM N/A 5/22/2024    Procedure: exam under anesthesia, anal biopsy for actinomyces and tuberculosis;  Surgeon: Jose Padilla MD;  Location: [facility] OR       Problem list:    Patient Active Problem List    Diagnosis Date Noted    Anal fistula 04/10/2024     Priority: Medium       Medications:  Current Outpatient Medications   Medication Sig Dispense Refill    acetaminophen (TYLENOL) 500 MG tablet Take 500 mg by mouth      allopurinol (ZYLOPRIM) 100 MG tablet Take 100 mg by mouth      aspirin 81 MG EC tablet Take 81 mg by mouth daily      atorvastatin (LIPITOR) 10 MG tablet Take 10 mg by mouth daily      bumetanide (BUMEX) 0.5 MG tablet Take 0.5 mg by mouth      ferrous sulfate (FEROSUL) 325 (65 Fe) MG tablet Take 325 mg by mouth      folic acid (FOLVITE) 1 MG tablet Take 1 mg by mouth      gabapentin (NEURONTIN)  300 MG capsule 2 capsules each morning, 2 capsules at noon, 3 capsules at bedtime.      hydrALAZINE (APRESOLINE) 50 MG tablet Take 50 mg by mouth      lisinopril (ZESTRIL) 30 MG tablet Take 30 mg by mouth      Magnesium Oxide 250 MG tablet Take 500 mg by mouth      Melatonin 10 MG CAPS TAKE 1 CAP BY MOUTH IN THE EVENING      nystatin (MYCOSTATIN) 156637 UNIT/GM external powder by topical route every 8 hours if needed (rash).      omeprazole (PRILOSEC) 40 MG DR capsule Take 1 capsule by mouth every morning      oxyCODONE (ROXICODONE) 5 MG tablet Take 1-2 tablets (5-10 mg) by mouth every 4 hours as needed for severe pain 15 tablet 0    Probiotic Product ([employer]) CAPS Take 1 capsule by mouth every morning      senna-docusate (SENOKOT-S/PERICOLACE) 8.6-50 MG tablet Take 2 tablets by mouth every morning      VITAMIN D-1000 MAX ST 25 MCG (1000 UT) tablet Take 1,000 Units by mouth      acetaminophen (TYLENOL) 325 MG tablet Take 650 mg by mouth every 4 hours as needed (Patient not taking: Reported on 6/4/2024)      aspirin (ASA) 325 MG tablet Take 325 mg by mouth (Patient not taking: Reported on 6/4/2024)      tamsulosin (FLOMAX) 0.4 MG capsule Take 1 capsule by mouth at bedtime (Patient not taking: Reported on 6/4/2024)      traZODone (DESYREL) 100 MG tablet Take 100 mg by mouth (Patient not taking: Reported on 6/4/2024)      vancomycin (VANCOCIN) 125 MG capsule EKIT BILLING (Patient not taking: Reported on 6/4/2024)         Allergies:  Allergies   Allergen Reactions    Rofecoxib Diarrhea and Rash    Sulfamethoxazole-Trimethoprim Nausea and Vomiting       Family history:  No family history on file.    Social history:  Social History     Tobacco Use    Smoking status: Never    Smokeless tobacco: Never   Substance Use Topics    Alcohol use: Yes     Comment: occasional     Marital status: .    Nursing Notes:   Soheila Mercado  6/4/2024  8:30 AM  Signed  Chief Complaint   Patient presents with    Post-op  "Visit       Vitals:    06/04/24 0823   BP: 120/71   BP Location: Right arm   Patient Position: Sitting   Cuff Size: Adult Large   Pulse: 60   SpO2: 97%   Weight: (!) 332 lb   Height: 5' 10.5\"       Body mass index is 46.96 kg/m .    Soheila Mercado CMA       15 minutes spent on the date of the encounter doing chart review, history and exam, documentation and further activities as noted above.   This is a postop visit.    SLICK Choi, NP-C  Colon and Rectal Surgery  [facility]    This note was created using speech recognition software and may contain unintended word substitutions.      "

## 2024-06-05 LAB — BACTERIA ABSC ANAEROBE+AEROBE CULT: NORMAL

## 2024-07-11 ENCOUNTER — OFFICE VISIT (OUTPATIENT)
Dept: SURGERY | Facility: CLINIC | Age: 74
End: 2024-07-11
Payer: COMMERCIAL

## 2024-07-11 VITALS
SYSTOLIC BLOOD PRESSURE: 126 MMHG | HEART RATE: 60 BPM | OXYGEN SATURATION: 95 % | WEIGHT: 315 LBS | HEIGHT: 71 IN | DIASTOLIC BLOOD PRESSURE: 75 MMHG | BODY MASS INDEX: 44.1 KG/M2

## 2024-07-11 DIAGNOSIS — Z09 FOLLOW-UP EXAMINATION AFTER COLORECTAL SURGERY: Primary | ICD-10-CM

## 2024-07-11 DIAGNOSIS — K60.30 ANAL FISTULA: ICD-10-CM

## 2024-07-11 PROCEDURE — 99024 POSTOP FOLLOW-UP VISIT: CPT | Performed by: NURSE PRACTITIONER

## 2024-07-11 ASSESSMENT — PAIN SCALES - GENERAL: PAINLEVEL: MILD PAIN (2)

## 2024-07-11 NOTE — LETTER
2024       RE: Aniket Ortiz  [address]  [facility] [address]     Dear Colleague,    Thank you for referring your patient, Aniket Ortiz, to the [facility] COLON AND RECTAL SURGERY [facility]IN[facility] [place] at [facility]. Please see a copy of my visit note below.    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    MR Pelvis 3/27/24:  Impression:   1. Complex transsphincteric fistula with internal opening located to  the left of midline posteriorly at 5 o' clock position approximately  1.2 cm from the anal verge, with multiple branching tracts opening  externally in the right and left gluteal folds.   2. No abscess.    Colonoscopy 24:  Impression:       - Preparation of the colon was inadequate.        - Diverticulosis from sigmoid to transverse colon.        - A single non-bleeding colonic angiodysplastic lesion.        - The entire examined colon is normal. Biopsied.        - The examined portion of the ileum was normal. Biopsied.   Recommendation:       - pt should have f/u at [place] for the perianal diseease        - f/u path        - Pt will need CT colonography or barium to check the colon for defects.        If repeated colonoscopy needed will 5d off fiber, 2d of prep.   Final Diagnosis    A. TERMINAL ILEUM, BIOPSY  --NO DIAGNOSTIC ABNORMALITY  B. COLON, ASCENDING/RIGHT, BIOPSY  --NO DIAGNOSTIC ABNORMALITY  C. COLON, DESCENDING/LEFT, BIOPSY  --NO DIAGNOSTIC ABNORMALITY  D. COLON, RECTUM, BIOPSY  --NO DIAGNOSTIC ABNORMALITY     Interval history: Aniket has been doing well. It is painful at night. No fevers or chills. He only has bowel  "movements every few days and these are always soft/formed. No diarrhea.     Physical Examination: Exam was chaperoned by Katie Ramirez EMT   /75 (BP Location: Right arm, Patient Position: Sitting, Cuff Size: Adult Regular)   Pulse 60   Ht 5' 10.5\"   Wt 327 lb   SpO2 95%   BMI 46.26 kg/m    General: alert, oriented, in no acute distress, sitting comfortably  HEENT: mucous membranes moist    Perianal external examination:  Surgical site in the posterior position with some hypergranulation tissue and silver nitrate applied. No erythema or purulent drainage. Packed with Mesalt strip. Yellow vessel loop seton in place anterior to this with ties to the external tract.    Digital rectal examination: Was deferred.    Anoscopy: Was deferred.    Assessment/Plan:  74 year old male status post EUA with lay-open fistulotomy X1 and rectal biopsies for actinomyces and TB, which were both negative. Should follow up per colonoscopy recommendations. Wound is healing well. Continue with daily dressing changes but switch to Mesalt packing due to bleeding. Follow up in 3-4 weeks for wound check. Once wound is healed, will have him meet with Dr. Padilla to discuss possible LIFT. Patient's questions were answered to his stated satisfaction and he is in agreement with this plan.     Medical history:  No past medical history on file.    Surgical history:  Past Surgical History:   Procedure Laterality Date     BIOPSY RECTUM N/A 5/22/2024    Procedure: exam under anesthesia, anal biopsy for actinomyces and tuberculosis;  Surgeon: Jose Padilla MD;  Location: [facility] OR       Problem list:    Patient Active Problem List    Diagnosis Date Noted     Anal fistula 04/10/2024     Priority: Medium       Medications:  Current Outpatient Medications   Medication Sig Dispense Refill     acetaminophen (TYLENOL) 500 MG tablet Take 500 mg by mouth       allopurinol (ZYLOPRIM) 100 MG tablet Take 100 mg by mouth       aspirin (ASA) 325 MG tablet " Take 325 mg by mouth       atorvastatin (LIPITOR) 10 MG tablet Take 10 mg by mouth daily       bumetanide (BUMEX) 0.5 MG tablet Take 0.5 mg by mouth       ferrous sulfate (FEROSUL) 325 (65 Fe) MG tablet Take 325 mg by mouth       folic acid (FOLVITE) 1 MG tablet Take 1 mg by mouth       gabapentin (NEURONTIN) 300 MG capsule 2 capsules each morning, 2 capsules at noon, 3 capsules at bedtime.       hydrALAZINE (APRESOLINE) 50 MG tablet Take 50 mg by mouth       lisinopril (ZESTRIL) 30 MG tablet Take 30 mg by mouth       Melatonin 10 MG CAPS TAKE 1 CAP BY MOUTH IN THE EVENING       nystatin (MYCOSTATIN) 430449 UNIT/GM external powder by topical route every 8 hours if needed (rash).       omeprazole (PRILOSEC) 40 MG DR capsule Take 1 capsule by mouth every morning       Probiotic Product ([place]) CAPS Take 1 capsule by mouth every morning       senna-docusate (SENOKOT-S/PERICOLACE) 8.6-50 MG tablet Take 2 tablets by mouth every morning       traZODone (DESYREL) 100 MG tablet Take 100 mg by mouth       VITAMIN D-1000 MAX ST 25 MCG (1000 UT) tablet Take 1,000 Units by mouth       acetaminophen (TYLENOL) 325 MG tablet Take 650 mg by mouth every 4 hours as needed (Patient not taking: Reported on 6/4/2024)       aspirin 81 MG EC tablet Take 81 mg by mouth daily (Patient not taking: Reported on 7/11/2024)       oxyCODONE (ROXICODONE) 5 MG tablet Take 1-2 tablets (5-10 mg) by mouth every 4 hours as needed for severe pain (Patient not taking: Reported on 7/11/2024) 15 tablet 0     vancomycin (VANCOCIN) 125 MG capsule EKIT BILLING (Patient not taking: Reported on 6/4/2024)         Allergies:  Allergies   Allergen Reactions     Rofecoxib Diarrhea and Rash     Sulfamethoxazole-Trimethoprim Nausea and Vomiting       Family history:  No family history on file.    Social history:  Social History     Tobacco Use     Smoking status: Never     Smokeless tobacco: Never   Substance Use Topics     Alcohol use: Yes      "Comment: occasional     Marital status: .    Nursing Notes:   Katie Ramirez  7/11/2024  7:24 AM  Signed  Chief Complaint   Patient presents with     Post-op Visit       Vitals:    07/11/24 0720   BP: 126/75   BP Location: Right arm   Patient Position: Sitting   Cuff Size: Adult Regular   Pulse: 60   SpO2: 95%   Weight: 327 lb   Height: 5' 10.5\"       Body mass index is 46.26 kg/m .    MARGO Damon     15 minutes spent on the date of the encounter doing chart review, history and exam, documentation and further activities as noted above.   This is a postop visit.    SLICK Lake, NP-C  Colon and Rectal Surgery  [facility]    This note was created using speech recognition software and may contain unintended word substitutions.        Again, thank you for allowing me to participate in the care of your patient.      Sincerely,    SLCIK Lake CNP    "

## 2024-07-11 NOTE — PROGRESS NOTES
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    MR Pelvis 3/27/24:  Impression:   1. Complex transsphincteric fistula with internal opening located to  the left of midline posteriorly at 5 o' clock position approximately  1.2 cm from the anal verge, with multiple branching tracts opening  externally in the right and left gluteal folds.   2. No abscess.    Colonoscopy 24:  Impression:       - Preparation of the colon was inadequate.        - Diverticulosis from sigmoid to transverse colon.        - A single non-bleeding colonic angiodysplastic lesion.        - The entire examined colon is normal. Biopsied.        - The examined portion of the ileum was normal. Biopsied.   Recommendation:       - pt should have f/u at [place] for the perianal diseease        - f/u path        - Pt will need CT colonography or barium to check the colon for defects.        If repeated colonoscopy needed will 5d off fiber, 2d of prep.   Final Diagnosis    A. TERMINAL ILEUM, BIOPSY  --NO DIAGNOSTIC ABNORMALITY  B. COLON, ASCENDING/RIGHT, BIOPSY  --NO DIAGNOSTIC ABNORMALITY  C. COLON, DESCENDING/LEFT, BIOPSY  --NO DIAGNOSTIC ABNORMALITY  D. COLON, RECTUM, BIOPSY  --NO DIAGNOSTIC ABNORMALITY     Interval history: Aniket has been doing well. It is painful at night. No fevers or chills. He only has bowel movements every few days and these are always soft/formed. No diarrhea.     Physical Examination: Exam was chaperoned by MARGO Damon   /75 (BP Location: Right arm, Patient Position: Sitting, Cuff Size: Adult Regular)   Pulse 60   Ht 5' 10.5\"   Wt 327 lb   SpO2 95%   BMI 46.26 kg/m    General: alert, oriented, in no " acute distress, sitting comfortably  HEENT: mucous membranes moist    Perianal external examination:  Surgical site in the posterior position with some hypergranulation tissue and silver nitrate applied. No erythema or purulent drainage. Packed with Mesalt strip. Yellow vessel loop seton in place anterior to this with ties to the external tract.    Digital rectal examination: Was deferred.    Anoscopy: Was deferred.    Assessment/Plan:  74 year old male status post EUA with lay-open fistulotomy X1 and rectal biopsies for actinomyces and TB, which were both negative. Should follow up per colonoscopy recommendations. Wound is healing well. Continue with daily dressing changes but switch to Mesalt packing due to bleeding. Follow up in 3-4 weeks for wound check. Once wound is healed, will have him meet with Dr. Padilla to discuss possible LIFT. Patient's questions were answered to his stated satisfaction and he is in agreement with this plan.     Medical history:  No past medical history on file.    Surgical history:  Past Surgical History:   Procedure Laterality Date    BIOPSY RECTUM N/A 5/22/2024    Procedure: exam under anesthesia, anal biopsy for actinomyces and tuberculosis;  Surgeon: Jose Padilal MD;  Location: [facility] OR       Problem list:    Patient Active Problem List    Diagnosis Date Noted    Anal fistula 04/10/2024     Priority: Medium       Medications:  Current Outpatient Medications   Medication Sig Dispense Refill    acetaminophen (TYLENOL) 500 MG tablet Take 500 mg by mouth      allopurinol (ZYLOPRIM) 100 MG tablet Take 100 mg by mouth      aspirin (ASA) 325 MG tablet Take 325 mg by mouth      atorvastatin (LIPITOR) 10 MG tablet Take 10 mg by mouth daily      bumetanide (BUMEX) 0.5 MG tablet Take 0.5 mg by mouth      ferrous sulfate (FEROSUL) 325 (65 Fe) MG tablet Take 325 mg by mouth      folic acid (FOLVITE) 1 MG tablet Take 1 mg by mouth      gabapentin (NEURONTIN) 300 MG capsule 2 capsules each  "morning, 2 capsules at noon, 3 capsules at bedtime.      hydrALAZINE (APRESOLINE) 50 MG tablet Take 50 mg by mouth      lisinopril (ZESTRIL) 30 MG tablet Take 30 mg by mouth      Melatonin 10 MG CAPS TAKE 1 CAP BY MOUTH IN THE EVENING      nystatin (MYCOSTATIN) 347251 UNIT/GM external powder by topical route every 8 hours if needed (rash).      omeprazole (PRILOSEC) 40 MG DR capsule Take 1 capsule by mouth every morning      Probiotic Product ([employer]) CAPS Take 1 capsule by mouth every morning      senna-docusate (SENOKOT-S/PERICOLACE) 8.6-50 MG tablet Take 2 tablets by mouth every morning      traZODone (DESYREL) 100 MG tablet Take 100 mg by mouth      VITAMIN D-1000 MAX ST 25 MCG (1000 UT) tablet Take 1,000 Units by mouth      acetaminophen (TYLENOL) 325 MG tablet Take 650 mg by mouth every 4 hours as needed (Patient not taking: Reported on 6/4/2024)      aspirin 81 MG EC tablet Take 81 mg by mouth daily (Patient not taking: Reported on 7/11/2024)      oxyCODONE (ROXICODONE) 5 MG tablet Take 1-2 tablets (5-10 mg) by mouth every 4 hours as needed for severe pain (Patient not taking: Reported on 7/11/2024) 15 tablet 0    vancomycin (VANCOCIN) 125 MG capsule EKIT BILLING (Patient not taking: Reported on 6/4/2024)         Allergies:  Allergies   Allergen Reactions    Rofecoxib Diarrhea and Rash    Sulfamethoxazole-Trimethoprim Nausea and Vomiting       Family history:  No family history on file.    Social history:  Social History     Tobacco Use    Smoking status: Never    Smokeless tobacco: Never   Substance Use Topics    Alcohol use: Yes     Comment: occasional     Marital status: .    Nursing Notes:   Katie Ramirez  7/11/2024  7:24 AM  Signed  Chief Complaint   Patient presents with    Post-op Visit       Vitals:    07/11/24 0720   BP: 126/75   BP Location: Right arm   Patient Position: Sitting   Cuff Size: Adult Regular   Pulse: 60   SpO2: 95%   Weight: 327 lb   Height: 5' 10.5\"       Body mass " index is 46.26 kg/m .    MARGO Damon     15 minutes spent on the date of the encounter doing chart review, history and exam, documentation and further activities as noted above.   This is a postop visit.    SLICK Choi, NP-C  Colon and Rectal Surgery  [facility]    This note was created using speech recognition software and may contain unintended word substitutions.

## 2024-07-11 NOTE — NURSING NOTE
"Chief Complaint   Patient presents with    Post-op Visit       Vitals:    07/11/24 0720   BP: 126/75   BP Location: Right arm   Patient Position: Sitting   Cuff Size: Adult Regular   Pulse: 60   SpO2: 95%   Weight: 327 lb   Height: 5' 10.5\"       Body mass index is 46.26 kg/m .    Katie Ramirez, EMT  "

## 2024-07-17 LAB
ACID FAST STAIN (ARUP): NORMAL

## 2024-08-01 ENCOUNTER — OFFICE VISIT (OUTPATIENT)
Dept: SURGERY | Facility: CLINIC | Age: 74
End: 2024-08-01
Payer: COMMERCIAL

## 2024-08-01 VITALS
HEIGHT: 71 IN | BODY MASS INDEX: 44.1 KG/M2 | SYSTOLIC BLOOD PRESSURE: 129 MMHG | WEIGHT: 315 LBS | DIASTOLIC BLOOD PRESSURE: 84 MMHG | OXYGEN SATURATION: 97 % | HEART RATE: 59 BPM

## 2024-08-01 DIAGNOSIS — Z09 FOLLOW-UP EXAMINATION AFTER COLORECTAL SURGERY: Primary | ICD-10-CM

## 2024-08-01 PROCEDURE — 99024 POSTOP FOLLOW-UP VISIT: CPT | Performed by: NURSE PRACTITIONER

## 2024-08-01 RX ORDER — FERRIC SUBSULFATE 0.21 G/G
LIQUID TOPICAL ONCE
Status: COMPLETED | OUTPATIENT
Start: 2024-08-01 | End: 2024-08-01

## 2024-08-01 RX ADMIN — FERRIC SUBSULFATE: 0.21 LIQUID TOPICAL at 08:41

## 2024-08-01 ASSESSMENT — PAIN SCALES - GENERAL: PAINLEVEL: NO PAIN (0)

## 2024-08-01 NOTE — LETTER
2024       RE: Aniket Ortiz  [address]  [facility] [address]     Dear Colleague,    Thank you for referring your patient, Aniket Ortiz, to the [facility] COLON AND RECTAL SURGERY [facility] [place] at [facility]. Please see a copy of my visit note below.    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 Padilla and rectal biopsies for actinomyces and TB, which were both negative.    MR Pelvis 3/27/24:  Impression:   1. Complex transsphincteric fistula with internal opening located to  the left of midline posteriorly at 5 o' clock position approximately  1.2 cm from the anal verge, with multiple branching tracts opening  externally in the right and left gluteal folds.   2. No abscess.    Colonoscopy 24:  Impression:       - Preparation of the colon was inadequate.        - Diverticulosis from sigmoid to transverse colon.        - A single non-bleeding colonic angiodysplastic lesion.        - The entire examined colon is normal. Biopsied.        - The examined portion of the ileum was normal. Biopsied.   Recommendation:       - pt should have f/u at [place] for the perianal diseease        - f/u path        - Pt will need CT colonography or barium to check the colon for defects.        If repeated colonoscopy needed will 5d off fiber, 2d of prep.   Final Diagnosis    A. TERMINAL ILEUM, BIOPSY  --NO DIAGNOSTIC ABNORMALITY  B. COLON, ASCENDING/RIGHT, BIOPSY  --NO DIAGNOSTIC ABNORMALITY  C. COLON, DESCENDING/LEFT, BIOPSY  --NO DIAGNOSTIC ABNORMALITY  D. COLON, RECTUM, BIOPSY  --NO DIAGNOSTIC ABNORMALITY     Interval history: Aniket has been doing well but has pain with sitting and is very  "frustrated by still having the seton. His family reports that Aniket mostly sits at home.     Physical Examination: Exam was chaperoned by Katie Ramirez EMT   /84 (BP Location: Right arm, Patient Position: Sitting, Cuff Size: Adult Regular)   Pulse 59   Ht 5' 10.5\"   Wt 327 lb   SpO2 97%   BMI 46.26 kg/m    General: alert, oriented, in no acute distress, sitting comfortably  HEENT: mucous membranes moist    Perianal external examination:  Surgical site in the posterior position with a large amount of active bleeding. Was able to get this to stop with cautery and [place]. Packed with Surgifoam.     Digital rectal examination: Was deferred.    Anoscopy: Was deferred.    Assessment/Plan:  74 year old male status post EUA with lay-open fistulotomy X1 and rectal biopsies for actinomyces and TB, which were both negative. Should follow up per colonoscopy recommendations. Wound with a lot of bleeding today. I was able to get this to stop with some difficulty. Pack tighter with Mesalt at home and follow up in 3-4 weeks for recheck. I encouraged him to get up and move more at home as I think the prolonged sitting is delaying healing. Because of how slow this current wound is healing, I am concerned about him having a LIFT procedure for the fistula. I discussed with him that I think the best option would be long term seton. He is not happy with this and can discuss more with Dr. Padilla once current wound is healed, if he would like.     Medical history:  No past medical history on file.    Surgical history:  Past Surgical History:   Procedure Laterality Date    BIOPSY RECTUM N/A 5/22/2024    Procedure: exam under anesthesia, anal biopsy for actinomyces and tuberculosis;  Surgeon: Jose Padilla MD;  Location: [facility] OR       Problem list:    Patient Active Problem List    Diagnosis Date Noted    Anal fistula 04/10/2024     Priority: Medium       Medications:  Current Outpatient Medications   Medication Sig Dispense " Refill    acetaminophen (TYLENOL) 500 MG tablet Take 500 mg by mouth      allopurinol (ZYLOPRIM) 100 MG tablet Take 100 mg by mouth      atorvastatin (LIPITOR) 10 MG tablet Take 10 mg by mouth daily      bumetanide (BUMEX) 0.5 MG tablet Take 0.5 mg by mouth      ferrous sulfate (FEROSUL) 325 (65 Fe) MG tablet Take 325 mg by mouth      folic acid (FOLVITE) 1 MG tablet Take 1 mg by mouth      gabapentin (NEURONTIN) 300 MG capsule 2 capsules each morning, 2 capsules at noon, 3 capsules at bedtime.      hydrALAZINE (APRESOLINE) 50 MG tablet Take 50 mg by mouth      lisinopril (ZESTRIL) 30 MG tablet Take 30 mg by mouth      Melatonin 10 MG CAPS TAKE 1 CAP BY MOUTH IN THE EVENING      nystatin (MYCOSTATIN) 435907 UNIT/GM external powder by topical route every 8 hours if needed (rash).      omeprazole (PRILOSEC) 40 MG DR capsule Take 1 capsule by mouth every morning      Probiotic Product ([employer]) CAPS Take 1 capsule by mouth every morning      senna-docusate (SENOKOT-S/PERICOLACE) 8.6-50 MG tablet Take 2 tablets by mouth every morning      VITAMIN D-1000 MAX ST 25 MCG (1000 UT) tablet Take 1,000 Units by mouth      acetaminophen (TYLENOL) 325 MG tablet Take 650 mg by mouth every 4 hours as needed (Patient not taking: Reported on 6/4/2024)      aspirin (ASA) 325 MG tablet Take 325 mg by mouth (Patient not taking: Reported on 8/1/2024)      aspirin 81 MG EC tablet Take 81 mg by mouth daily (Patient not taking: Reported on 7/11/2024)      oxyCODONE (ROXICODONE) 5 MG tablet Take 1-2 tablets (5-10 mg) by mouth every 4 hours as needed for severe pain (Patient not taking: Reported on 7/11/2024) 15 tablet 0    traZODone (DESYREL) 100 MG tablet Take 100 mg by mouth (Patient not taking: Reported on 8/1/2024)      vancomycin (VANCOCIN) 125 MG capsule EKIT BILLING (Patient not taking: Reported on 6/4/2024)         Allergies:  Allergies   Allergen Reactions    Rofecoxib Diarrhea and Rash    Sulfamethoxazole-Trimethoprim  "Nausea and Vomiting       Family history:  No family history on file.    Social history:  Social History     Tobacco Use    Smoking status: Never    Smokeless tobacco: Never   Substance Use Topics    Alcohol use: Yes     Comment: occasional     Marital status: .    Nursing Notes:   Katie Ramirez  8/1/2024  7:24 AM  Signed  Chief Complaint   Patient presents with    Post-op Visit       Vitals:    08/01/24 0721   BP: 129/84   BP Location: Right arm   Patient Position: Sitting   Cuff Size: Adult Regular   Pulse: 59   SpO2: 97%   Weight: 327 lb   Height: 5' 10.5\"       Body mass index is 46.26 kg/m .    Katie Ramirez, MARGO     30 minutes spent on the date of the encounter doing chart review, history and exam, documentation and further activities as noted above.   This is a postop visit.      This note was created using speech recognition software and may contain unintended word substitutions.        Again, thank you for allowing me to participate in the care of your patient.      Sincerely,    SLICK Lake CNP    "

## 2024-08-01 NOTE — PROGRESS NOTES
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 Padilla and rectal biopsies for actinomyces and TB, which were both negative.    MR Pelvis 3/27/24:  Impression:   1. Complex transsphincteric fistula with internal opening located to  the left of midline posteriorly at 5 o' clock position approximately  1.2 cm from the anal verge, with multiple branching tracts opening  externally in the right and left gluteal folds.   2. No abscess.    Colonoscopy 24:  Impression:       - Preparation of the colon was inadequate.        - Diverticulosis from sigmoid to transverse colon.        - A single non-bleeding colonic angiodysplastic lesion.        - The entire examined colon is normal. Biopsied.        - The examined portion of the ileum was normal. Biopsied.   Recommendation:       - pt should have f/u at [facility] for the perianal diseease        - f/u path        - Pt will need CT colonography or barium to check the colon for defects.        If repeated colonoscopy needed will 5d off fiber, 2d of prep.   Final Diagnosis    A. TERMINAL ILEUM, BIOPSY  --NO DIAGNOSTIC ABNORMALITY  B. COLON, ASCENDING/RIGHT, BIOPSY  --NO DIAGNOSTIC ABNORMALITY  C. COLON, DESCENDING/LEFT, BIOPSY  --NO DIAGNOSTIC ABNORMALITY  D. COLON, RECTUM, BIOPSY  --NO DIAGNOSTIC ABNORMALITY     Interval history: Aniket has been doing well but has pain with sitting and is very frustrated by still having the seton. His family reports that Aniket mostly sits at home.     Physical Examination: Exam was chaperoned by Katie Ramirez, EMT   /84 (BP Location: Right arm, Patient Position: Sitting, Cuff Size: Adult Regular)   Pulse 59   Ht 5' 10.5\"   Wt 327 lb   SpO2 97%   BMI 46.26 kg/m    General: alert, " oriented, in no acute distress, sitting comfortably  HEENT: mucous membranes moist    Perianal external examination:  Surgical site in the posterior position with a large amount of active bleeding. Was able to get this to stop with cautery and Monsel. Packed with Surgifoam.     Digital rectal examination: Was deferred.    Anoscopy: Was deferred.    Assessment/Plan:  74 year old male status post EUA with lay-open fistulotomy X1 and rectal biopsies for actinomyces and TB, which were both negative. Should follow up per colonoscopy recommendations. Wound with a lot of bleeding today. I was able to get this to stop with some difficulty. Pack tighter with Mesalt at home and follow up in 3-4 weeks for recheck. I encouraged him to get up and move more at home as I think the prolonged sitting is delaying healing. Because of how slow this current wound is healing, I am concerned about him having a LIFT procedure for the fistula. I discussed with him that I think the best option would be long term seton. He is not happy with this and can discuss more with Dr. Padilla once current wound is healed, if he would like.     Medical history:  No past medical history on file.    Surgical history:  Past Surgical History:   Procedure Laterality Date    BIOPSY RECTUM N/A 5/22/2024    Procedure: exam under anesthesia, anal biopsy for actinomyces and tuberculosis;  Surgeon: Jose Padilla MD;  Location: [facility] OR       Problem list:    Patient Active Problem List    Diagnosis Date Noted    Anal fistula 04/10/2024     Priority: Medium       Medications:  Current Outpatient Medications   Medication Sig Dispense Refill    acetaminophen (TYLENOL) 500 MG tablet Take 500 mg by mouth      allopurinol (ZYLOPRIM) 100 MG tablet Take 100 mg by mouth      atorvastatin (LIPITOR) 10 MG tablet Take 10 mg by mouth daily      bumetanide (BUMEX) 0.5 MG tablet Take 0.5 mg by mouth      ferrous sulfate (FEROSUL) 325 (65 Fe) MG tablet Take 325 mg by mouth       folic acid (FOLVITE) 1 MG tablet Take 1 mg by mouth      gabapentin (NEURONTIN) 300 MG capsule 2 capsules each morning, 2 capsules at noon, 3 capsules at bedtime.      hydrALAZINE (APRESOLINE) 50 MG tablet Take 50 mg by mouth      lisinopril (ZESTRIL) 30 MG tablet Take 30 mg by mouth      Melatonin 10 MG CAPS TAKE 1 CAP BY MOUTH IN THE EVENING      nystatin (MYCOSTATIN) 271985 UNIT/GM external powder by topical route every 8 hours if needed (rash).      omeprazole (PRILOSEC) 40 MG DR capsule Take 1 capsule by mouth every morning      Probiotic Product (Swallow Solutions) CAPS Take 1 capsule by mouth every morning      senna-docusate (SENOKOT-S/PERICOLACE) 8.6-50 MG tablet Take 2 tablets by mouth every morning      VITAMIN D-1000 MAX ST 25 MCG (1000 UT) tablet Take 1,000 Units by mouth      acetaminophen (TYLENOL) 325 MG tablet Take 650 mg by mouth every 4 hours as needed (Patient not taking: Reported on 6/4/2024)      aspirin (ASA) 325 MG tablet Take 325 mg by mouth (Patient not taking: Reported on 8/1/2024)      aspirin 81 MG EC tablet Take 81 mg by mouth daily (Patient not taking: Reported on 7/11/2024)      oxyCODONE (ROXICODONE) 5 MG tablet Take 1-2 tablets (5-10 mg) by mouth every 4 hours as needed for severe pain (Patient not taking: Reported on 7/11/2024) 15 tablet 0    traZODone (DESYREL) 100 MG tablet Take 100 mg by mouth (Patient not taking: Reported on 8/1/2024)      vancomycin (VANCOCIN) 125 MG capsule EKIT BILLING (Patient not taking: Reported on 6/4/2024)         Allergies:  Allergies   Allergen Reactions    Rofecoxib Diarrhea and Rash    Sulfamethoxazole-Trimethoprim Nausea and Vomiting       Family history:  No family history on file.    Social history:  Social History     Tobacco Use    Smoking status: Never    Smokeless tobacco: Never   Substance Use Topics    Alcohol use: Yes     Comment: occasional     Marital status: .    Nursing Notes:   Katie Ramirez  8/1/2024  7:24 AM  Signed  Chief  "Complaint   Patient presents with    Post-op Visit       Vitals:    08/01/24 0721   BP: 129/84   BP Location: Right arm   Patient Position: Sitting   Cuff Size: Adult Regular   Pulse: 59   SpO2: 97%   Weight: 327 lb   Height: 5' 10.5\"       Body mass index is 46.26 kg/m .    MARGO Damon     30 minutes spent on the date of the encounter doing chart review, history and exam, documentation and further activities as noted above.   This is a postop visit.    SLICK Choi, NP-C  Colon and Rectal Surgery  [facility]    This note was created using speech recognition software and may contain unintended word substitutions.      "

## 2024-08-01 NOTE — NURSING NOTE
"Chief Complaint   Patient presents with    Post-op Visit       Vitals:    08/01/24 0721   BP: 129/84   BP Location: Right arm   Patient Position: Sitting   Cuff Size: Adult Regular   Pulse: 59   SpO2: 97%   Weight: 327 lb   Height: 5' 10.5\"       Body mass index is 46.26 kg/m .    Katie Ramirez, EMT  "

## 2024-08-29 ENCOUNTER — OFFICE VISIT (OUTPATIENT)
Dept: SURGERY | Facility: CLINIC | Age: 74
End: 2024-08-29
Payer: MEDICARE

## 2024-08-29 VITALS
SYSTOLIC BLOOD PRESSURE: 132 MMHG | HEIGHT: 71 IN | DIASTOLIC BLOOD PRESSURE: 76 MMHG | BODY MASS INDEX: 44.1 KG/M2 | WEIGHT: 315 LBS | OXYGEN SATURATION: 96 % | HEART RATE: 51 BPM

## 2024-08-29 DIAGNOSIS — K60.30 ANAL FISTULA: ICD-10-CM

## 2024-08-29 DIAGNOSIS — Z09 FOLLOW-UP EXAMINATION AFTER COLORECTAL SURGERY: Primary | ICD-10-CM

## 2024-08-29 PROCEDURE — 99024 POSTOP FOLLOW-UP VISIT: CPT | Performed by: NURSE PRACTITIONER

## 2024-08-29 ASSESSMENT — PAIN SCALES - GENERAL: PAINLEVEL: NO PAIN (0)

## 2024-08-29 NOTE — NURSING NOTE
"Chief Complaint   Patient presents with    Post-op Visit       Vitals:    08/29/24 0726   BP: 132/76   BP Location: Right arm   Patient Position: Sitting   Cuff Size: Adult Large   Pulse: 51   SpO2: 96%   Weight: (!) 331 lb   Height: 5' 10.5\"       Body mass index is 46.82 kg/m .    Katie Ramirez, EMT  "

## (undated) DEVICE — SURGICEL POWDER ABSORBABLE HEMOSTAT 3GM 3013SP

## (undated) DEVICE — PACK RECTAL UMMC

## (undated) DEVICE — SURGICEL HEMOSTAT 4X8" 1952

## (undated) DEVICE — SOL WATER IRRIG 1000ML BOTTLE 2F7114

## (undated) DEVICE — DRSG GAUZE 4X4" TRAY 6939

## (undated) DEVICE — LINEN TOWEL PACK X5 5464

## (undated) DEVICE — STRAP UNIVERSAL POSITIONING 2-PIECE 4X47X76" 91-287

## (undated) DEVICE — DRSG ABDOMINAL 07 1/2X8" 7197D

## (undated) DEVICE — VESSEL LOOPS YELLOW MAXI 31145694

## (undated) DEVICE — SPONGE LAP 18X18" X8435

## (undated) DEVICE — TUBING SMOKE EVAC 2.2CMX3M SEA3715

## (undated) DEVICE — SUCTION MANIFOLD NEPTUNE 2 SYS 4 PORT 0702-020-000

## (undated) DEVICE — DRAIN PENROSE 1/4"X18" LATEX  30416-025

## (undated) RX ORDER — LIDOCAINE HYDROCHLORIDE AND EPINEPHRINE 10; 10 MG/ML; UG/ML
INJECTION, SOLUTION INFILTRATION; PERINEURAL
Status: DISPENSED
Start: 2024-05-22

## (undated) RX ORDER — ACETAMINOPHEN 325 MG/1
TABLET ORAL
Status: DISPENSED
Start: 2024-05-22

## (undated) RX ORDER — LIDOCAINE HYDROCHLORIDE 10 MG/ML
INJECTION, SOLUTION EPIDURAL; INFILTRATION; INTRACAUDAL; PERINEURAL
Status: DISPENSED
Start: 2024-05-22

## (undated) RX ORDER — ONDANSETRON 2 MG/ML
INJECTION INTRAMUSCULAR; INTRAVENOUS
Status: DISPENSED
Start: 2024-05-22

## (undated) RX ORDER — BUPIVACAINE HYDROCHLORIDE 2.5 MG/ML
INJECTION, SOLUTION EPIDURAL; INFILTRATION; INTRACAUDAL
Status: DISPENSED
Start: 2024-05-22

## (undated) RX ORDER — GLYCOPYRROLATE 0.2 MG/ML
INJECTION, SOLUTION INTRAMUSCULAR; INTRAVENOUS
Status: DISPENSED
Start: 2024-05-22

## (undated) RX ORDER — FENTANYL CITRATE 50 UG/ML
INJECTION, SOLUTION INTRAMUSCULAR; INTRAVENOUS
Status: DISPENSED
Start: 2024-05-22

## (undated) RX ORDER — FERRIC SUBSULFATE 0.21 G/G
LIQUID TOPICAL
Status: DISPENSED
Start: 2024-08-01